# Patient Record
Sex: FEMALE | Race: AMERICAN INDIAN OR ALASKA NATIVE
[De-identification: names, ages, dates, MRNs, and addresses within clinical notes are randomized per-mention and may not be internally consistent; named-entity substitution may affect disease eponyms.]

---

## 2017-02-04 ENCOUNTER — HOSPITAL ENCOUNTER (EMERGENCY)
Dept: HOSPITAL 5 - ED | Age: 67
LOS: 1 days | Discharge: HOME | End: 2017-02-05
Payer: MEDICARE

## 2017-02-04 VITALS — SYSTOLIC BLOOD PRESSURE: 144 MMHG | DIASTOLIC BLOOD PRESSURE: 76 MMHG

## 2017-02-04 DIAGNOSIS — G89.29: ICD-10-CM

## 2017-02-04 DIAGNOSIS — M17.9: Primary | ICD-10-CM

## 2017-02-04 DIAGNOSIS — I25.2: ICD-10-CM

## 2017-02-04 DIAGNOSIS — Z90.710: ICD-10-CM

## 2017-02-04 DIAGNOSIS — H66.002: ICD-10-CM

## 2017-02-04 DIAGNOSIS — Z87.891: ICD-10-CM

## 2017-02-04 DIAGNOSIS — Z90.49: ICD-10-CM

## 2017-02-04 DIAGNOSIS — M19.90: ICD-10-CM

## 2017-02-04 DIAGNOSIS — J45.909: ICD-10-CM

## 2017-02-04 DIAGNOSIS — Z79.82: ICD-10-CM

## 2017-02-04 DIAGNOSIS — I10: ICD-10-CM

## 2017-02-04 DIAGNOSIS — Z98.51: ICD-10-CM

## 2017-02-04 DIAGNOSIS — K21.9: ICD-10-CM

## 2017-02-04 PROCEDURE — 99282 EMERGENCY DEPT VISIT SF MDM: CPT
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
## 2017-04-13 ENCOUNTER — HOSPITAL ENCOUNTER (EMERGENCY)
Dept: HOSPITAL 5 - ED | Age: 67
LOS: 1 days | Discharge: HOME | End: 2017-04-14
Payer: MEDICARE
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
## 2017-04-13 DIAGNOSIS — I25.2: ICD-10-CM

## 2017-04-13 DIAGNOSIS — Z88.8: ICD-10-CM

## 2017-04-13 DIAGNOSIS — Z79.82: ICD-10-CM

## 2017-04-13 DIAGNOSIS — Z88.1: ICD-10-CM

## 2017-04-13 DIAGNOSIS — G89.29: ICD-10-CM

## 2017-04-13 DIAGNOSIS — K21.9: ICD-10-CM
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
## 2017-04-13 DIAGNOSIS — I10: ICD-10-CM

## 2017-04-13 DIAGNOSIS — M54.5: Primary | ICD-10-CM

## 2017-04-13 DIAGNOSIS — M19.90: ICD-10-CM
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
## 2017-04-13 DIAGNOSIS — E11.9: ICD-10-CM
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
## 2017-04-13 DIAGNOSIS — J45.909: ICD-10-CM

## 2017-04-13 PROCEDURE — 99282 EMERGENCY DEPT VISIT SF MDM: CPT

## 2017-04-13 PROCEDURE — 96372 THER/PROPH/DIAG INJ SC/IM: CPT
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
## 2017-04-14 VITALS — SYSTOLIC BLOOD PRESSURE: 142 MMHG | DIASTOLIC BLOOD PRESSURE: 84 MMHG

## 2017-05-30 ENCOUNTER — HOSPITAL ENCOUNTER (EMERGENCY)
Dept: HOSPITAL 5 - ED | Age: 67
LOS: 1 days | Discharge: HOME | End: 2017-05-31
Payer: MEDICARE

## 2017-05-30 DIAGNOSIS — R11.2: Primary | ICD-10-CM

## 2017-05-30 DIAGNOSIS — I10: ICD-10-CM

## 2017-05-30 DIAGNOSIS — K21.9: ICD-10-CM

## 2017-05-30 DIAGNOSIS — I25.2: ICD-10-CM

## 2017-05-30 DIAGNOSIS — J45.909: ICD-10-CM

## 2017-05-30 LAB
ALBUMIN SERPL-MCNC: 4.1 G/DL (ref 3.9–5)
ALBUMIN/GLOB SERPL: 1.1 %
ALP SERPL-CCNC: 79 UNITS/L (ref 35–129)
ALT SERPL-CCNC: 19 UNITS/L (ref 7–56)
ANION GAP SERPL CALC-SCNC: 17 MMOL/L
BASOPHILS NFR BLD AUTO: 0.6 % (ref 0–1.8)
BILIRUB SERPL-MCNC: 0.2 MG/DL (ref 0.1–1.2)
BILIRUB UR QL STRIP: (no result)
BLOOD UR QL VISUAL: (no result)
BUN SERPL-MCNC: 19 MG/DL (ref 7–17)
BUN/CREAT SERPL: 31.66 %
CALCIUM SERPL-MCNC: 9.6 MG/DL (ref 8.4–10.2)
CHLORIDE SERPL-SCNC: 98.7 MMOL/L (ref 98–107)
CO2 SERPL-SCNC: 28 MMOL/L (ref 22–30)
EOSINOPHIL NFR BLD AUTO: 2.1 % (ref 0–4.3)
GLUCOSE SERPL-MCNC: 87 MG/DL (ref 65–100)
HCT VFR BLD CALC: 39.6 % (ref 30.3–42.9)
HGB BLD-MCNC: 12.5 GM/DL (ref 10.1–14.3)
KETONES UR STRIP-MCNC: (no result) MG/DL
LEUKOCYTE ESTERASE UR QL STRIP: (no result)
LIPASE SERPL-CCNC: 49 UNITS/L (ref 13–60)
MCH RBC QN AUTO: 23 PG (ref 28–32)
MCHC RBC AUTO-ENTMCNC: 32 % (ref 30–34)
MCV RBC AUTO: 73 FL (ref 79–97)
MUCOUS THREADS #/AREA URNS HPF: (no result) /HPF
NITRITE UR QL STRIP: (no result)
PH UR STRIP: 5 [PH] (ref 5–7)
PLATELET # BLD: 330 K/MM3 (ref 140–440)
POTASSIUM SERPL-SCNC: 4.5 MMOL/L (ref 3.6–5)
PROT SERPL-MCNC: 7.7 G/DL (ref 6.3–8.2)
PROT UR STRIP-MCNC: (no result) MG/DL
RBC # BLD AUTO: 5.45 M/MM3 (ref 3.65–5.03)
RBC #/AREA URNS HPF: 4 /HPF (ref 0–6)
SODIUM SERPL-SCNC: 139 MMOL/L (ref 137–145)
UROBILINOGEN UR-MCNC: < 2 MG/DL (ref ?–2)
WBC # BLD AUTO: 7.9 K/MM3 (ref 4.5–11)
WBC #/AREA URNS HPF: 2 /HPF (ref 0–6)

## 2017-05-30 PROCEDURE — 80053 COMPREHEN METABOLIC PANEL: CPT

## 2017-05-30 PROCEDURE — 96375 TX/PRO/DX INJ NEW DRUG ADDON: CPT

## 2017-05-30 PROCEDURE — 85025 COMPLETE CBC W/AUTO DIFF WBC: CPT

## 2017-05-30 PROCEDURE — 96374 THER/PROPH/DIAG INJ IV PUSH: CPT

## 2017-05-30 PROCEDURE — 83690 ASSAY OF LIPASE: CPT

## 2017-05-30 PROCEDURE — 81001 URINALYSIS AUTO W/SCOPE: CPT

## 2017-05-30 PROCEDURE — 36415 COLL VENOUS BLD VENIPUNCTURE: CPT

## 2017-05-30 PROCEDURE — 96361 HYDRATE IV INFUSION ADD-ON: CPT

## 2017-05-30 PROCEDURE — 99283 EMERGENCY DEPT VISIT LOW MDM: CPT

## 2017-05-31 VITALS — DIASTOLIC BLOOD PRESSURE: 73 MMHG | SYSTOLIC BLOOD PRESSURE: 132 MMHG
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
## 2017-07-06 ENCOUNTER — HOSPITAL ENCOUNTER (EMERGENCY)
Dept: HOSPITAL 5 - ED | Age: 67
Discharge: HOME | End: 2017-07-06
Payer: MEDICARE

## 2017-07-06 VITALS — SYSTOLIC BLOOD PRESSURE: 109 MMHG | DIASTOLIC BLOOD PRESSURE: 59 MMHG

## 2017-07-06 DIAGNOSIS — J45.909: ICD-10-CM

## 2017-07-06 DIAGNOSIS — I25.2: ICD-10-CM

## 2017-07-06 DIAGNOSIS — Z79.82: ICD-10-CM

## 2017-07-06 DIAGNOSIS — I10: ICD-10-CM

## 2017-07-06 DIAGNOSIS — K21.9: ICD-10-CM

## 2017-07-06 DIAGNOSIS — Z87.891: ICD-10-CM

## 2017-07-06 DIAGNOSIS — Z88.1: ICD-10-CM

## 2017-07-06 DIAGNOSIS — Z88.0: ICD-10-CM

## 2017-07-06 DIAGNOSIS — Z88.8: ICD-10-CM

## 2017-07-06 DIAGNOSIS — M17.11: Primary | ICD-10-CM

## 2017-07-06 PROCEDURE — 73562 X-RAY EXAM OF KNEE 3: CPT

## 2017-07-06 PROCEDURE — 96372 THER/PROPH/DIAG INJ SC/IM: CPT

## 2017-07-06 PROCEDURE — 99283 EMERGENCY DEPT VISIT LOW MDM: CPT

## 2017-07-06 NOTE — XRAY REPORT
Right knee 3 views.



Findings: There is mild narrowing of the joint space especially 

medially. Bony mineralization is normal. There are no fractures or 

other acute findings.



Impression: Mild osteoarthritis.
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
## 2017-08-10 ENCOUNTER — HOSPITAL ENCOUNTER (EMERGENCY)
Dept: HOSPITAL 5 - ED | Age: 67
Discharge: HOME | End: 2017-08-10
Payer: MEDICARE

## 2017-08-10 VITALS — DIASTOLIC BLOOD PRESSURE: 72 MMHG | SYSTOLIC BLOOD PRESSURE: 145 MMHG

## 2017-08-10 DIAGNOSIS — G89.29: ICD-10-CM

## 2017-08-10 DIAGNOSIS — M54.9: ICD-10-CM

## 2017-08-10 DIAGNOSIS — M25.569: Primary | ICD-10-CM

## 2017-08-10 PROCEDURE — 99282 EMERGENCY DEPT VISIT SF MDM: CPT
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
## 2017-08-18 ENCOUNTER — HOSPITAL ENCOUNTER (EMERGENCY)
Dept: HOSPITAL 5 - ED | Age: 67
Discharge: HOME | End: 2017-08-18
Payer: MEDICARE

## 2017-08-18 VITALS — SYSTOLIC BLOOD PRESSURE: 166 MMHG | DIASTOLIC BLOOD PRESSURE: 79 MMHG

## 2017-08-18 DIAGNOSIS — Z88.1: ICD-10-CM

## 2017-08-18 DIAGNOSIS — G89.29: ICD-10-CM

## 2017-08-18 DIAGNOSIS — J45.909: ICD-10-CM

## 2017-08-18 DIAGNOSIS — I25.2: ICD-10-CM

## 2017-08-18 DIAGNOSIS — Z88.8: ICD-10-CM

## 2017-08-18 DIAGNOSIS — Z87.891: ICD-10-CM

## 2017-08-18 DIAGNOSIS — Z79.82: ICD-10-CM

## 2017-08-18 DIAGNOSIS — K21.9: ICD-10-CM

## 2017-08-18 DIAGNOSIS — M15.3: Primary | ICD-10-CM

## 2017-08-18 DIAGNOSIS — I10: ICD-10-CM

## 2017-08-18 LAB — URINE DRUGS OF ABUSE NOTE: (no result)

## 2017-08-18 PROCEDURE — 72100 X-RAY EXAM L-S SPINE 2/3 VWS: CPT

## 2017-08-18 PROCEDURE — 80307 DRUG TEST PRSMV CHEM ANLYZR: CPT

## 2017-08-18 NOTE — XRAY REPORT
FINAL REPORT



PROCEDURE:  XR SPINE LUMBOSACRAL 2-3V



TECHNIQUE:  Lumbar spine, three views



HISTORY:  lower back pain chronic 



COMPARISON:  No prior studies are available for comparison.



FINDINGS:  

There is 9 millimeters anterolisthesis of L5 on S1, with adjacent

significant endplate sclerosis and disc space narrowing. There

may be pars interarticularis defects of L5, which would be better

assessed with oblique views. No scoliosis. There is note of

unfused posterior elements of L5.



IMPRESSION:  

9 millimeters anterolisthesis of L5 on S1 with associated

significant degenerative disc changes. Possible L5 pars

interarticularis defects, which would be better assessed with

oblique views.
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
## 2017-08-19 NOTE — VASCULAR LAB REPORT
LEFT UPPER EXTREMITY VENOUS DUPLEX:



REASON FOR EXAM: Pain of the left upper extremity



COMMENTS ON THE LEFT:

All arm veins visualized are freely compressible without

evidence of internal echogenicity.  The subclavian and internal

jugular veins are free of thrombus.  Flow is spontaneous and phasic 

throughout.



COMMENTS ON THE RIGHT:

The subclavian and internal jugular veins are free of thrombus.  



IMPRESSION:



No evidence of acute or chronic deep venous thrombosis in the

left upper extremity.

## 2018-01-14 ENCOUNTER — HOSPITAL ENCOUNTER (EMERGENCY)
Dept: HOSPITAL 5 - ED | Age: 68
Discharge: HOME | End: 2018-01-14
Payer: MEDICARE

## 2018-01-14 DIAGNOSIS — J09.X2: Primary | ICD-10-CM

## 2018-01-14 DIAGNOSIS — I25.2: ICD-10-CM

## 2018-01-14 DIAGNOSIS — I10: ICD-10-CM

## 2018-01-14 DIAGNOSIS — K21.9: ICD-10-CM

## 2018-01-14 DIAGNOSIS — Z87.891: ICD-10-CM

## 2018-01-14 LAB
BASOPHILS # (AUTO): 0 K/MM3 (ref 0–0.1)
BASOPHILS NFR BLD AUTO: 1 % (ref 0–1.8)
BUN SERPL-MCNC: 25 MG/DL (ref 7–17)
BUN/CREAT SERPL: 28 %
CALCIUM SERPL-MCNC: 9.7 MG/DL (ref 8.4–10.2)
EOSINOPHIL # BLD AUTO: 0 K/MM3 (ref 0–0.4)
EOSINOPHIL NFR BLD AUTO: 1 % (ref 0–4.3)
HCT VFR BLD CALC: 43.3 % (ref 30.3–42.9)
HEMOLYSIS INDEX: 3
HGB BLD-MCNC: 13.4 GM/DL (ref 10.1–14.3)
LYMPHOCYTES # BLD AUTO: 2.2 K/MM3 (ref 1.2–5.4)
LYMPHOCYTES NFR BLD AUTO: 46.2 % (ref 13.4–35)
MCH RBC QN AUTO: 22 PG (ref 28–32)
MCHC RBC AUTO-ENTMCNC: 31 % (ref 30–34)
MCV RBC AUTO: 72 FL (ref 79–97)
MONOCYTES # (AUTO): 0.6 K/MM3 (ref 0–0.8)
MONOCYTES % (AUTO): 13.2 % (ref 0–7.3)
PLATELET # BLD: 304 K/MM3 (ref 140–440)
RBC # BLD AUTO: 6.04 M/MM3 (ref 3.65–5.03)

## 2018-01-14 PROCEDURE — 93005 ELECTROCARDIOGRAM TRACING: CPT

## 2018-01-14 PROCEDURE — 80048 BASIC METABOLIC PNL TOTAL CA: CPT

## 2018-01-14 PROCEDURE — 36415 COLL VENOUS BLD VENIPUNCTURE: CPT

## 2018-01-14 PROCEDURE — 85025 COMPLETE CBC W/AUTO DIFF WBC: CPT

## 2018-01-14 PROCEDURE — 71046 X-RAY EXAM CHEST 2 VIEWS: CPT

## 2018-01-14 PROCEDURE — 87491 CHLMYD TRACH DNA AMP PROBE: CPT

## 2018-01-14 PROCEDURE — 94640 AIRWAY INHALATION TREATMENT: CPT

## 2018-01-14 PROCEDURE — 93010 ELECTROCARDIOGRAM REPORT: CPT

## 2018-01-14 PROCEDURE — 94644 CONT INHLJ TX 1ST HOUR: CPT

## 2018-01-14 PROCEDURE — 87400 INFLUENZA A/B EACH AG IA: CPT

## 2018-01-14 PROCEDURE — 99284 EMERGENCY DEPT VISIT MOD MDM: CPT

## 2018-01-14 PROCEDURE — 96360 HYDRATION IV INFUSION INIT: CPT

## 2018-01-14 PROCEDURE — 82550 ASSAY OF CK (CPK): CPT
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
## 2018-01-14 NOTE — XRAY REPORT
FINAL REPORT



EXAM:  XR CHEST ROUTINE 2V



HISTORY:  persistent cough worsening 



TECHNIQUE:  2 views of the chest.



PRIORS:  12/08/2016



FINDINGS:  

The cardiomediastinal silhouette appears normal. The lungs are

clear. The bones and soft tissues are unremarkable.



IMPRESSION:  

No evidence of acute cardiopulmonary disease

## 2018-01-14 NOTE — EMERGENCY DEPARTMENT REPORT
Chief Complaint: Upper Respiratory Infection


Stated Complaint: VOMITING/DIARRHEA/CHEST COLD


Time Seen by Provider: 01/14/18 20:12





- HPI


History of Present Illness: 


67-year-old female past medical history arthritis asthma GERD MI hypertension 

kidney stones angina CAD presents with complaint of 4-5 days of bodyaches nasal 

congestion nausea and diarrhea.  Patient awake alert and oriented 3 

complaining of slightly productive cough and nasal drainage.





- ROS


Review of Systems: 


4-5 days of body aches, upper respiratory symptoms





- Exam


Vital Signs: 


 Vital Signs











  01/14/18





  17:22


 


Temperature 98.4 F


 


Pulse Rate 103 H


 


Respiratory 16





Rate 


 


Blood Pressure 119/71


 


O2 Sat by Pulse 97





Oximetry 











Physical Exam: 


Wheezing on auscultation bilaterally


MSE screening note: 


Focused history and physical exam performed.


Due to findings the following was ordered:


Screening Assessment/Plan/Differential Dx: Possible flu illness, asthma 

exacerbation


1- This initial assessment/diagnostic orders/clinical plan/ treatment(s) is/are 

subject to change based on pt's health status, clinical progression and re-

assessment by fellow clinical providers in the ED. Further treatment and workup 

at subsequent clinical provers discretion. Patient/guardians urged not to elope 

from ED as their condition may be serious if not clinically assessed and 

managed. 


2-flu swab sent, chest x-ray, labs


3-nebulizer treatment, oral fluid resuscitation








ED Disposition for MSE


Condition: Stable


Referrals: 


SHONDA TORREZ MD [Primary Care Provider] - 3-5 Days
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
## 2018-01-15 VITALS — SYSTOLIC BLOOD PRESSURE: 137 MMHG | DIASTOLIC BLOOD PRESSURE: 61 MMHG

## 2018-01-29 ENCOUNTER — HOSPITAL ENCOUNTER (OUTPATIENT)
Dept: HOSPITAL 5 - ED | Age: 68
Setting detail: OBSERVATION
LOS: 2 days | Discharge: HOME | End: 2018-01-31
Attending: INTERNAL MEDICINE | Admitting: INTERNAL MEDICINE
Payer: MEDICARE

## 2018-01-29 DIAGNOSIS — I25.10: ICD-10-CM

## 2018-01-29 DIAGNOSIS — Z90.710: ICD-10-CM

## 2018-01-29 DIAGNOSIS — Z90.49: ICD-10-CM

## 2018-01-29 DIAGNOSIS — Z87.442: ICD-10-CM

## 2018-01-29 DIAGNOSIS — Z87.891: ICD-10-CM

## 2018-01-29 DIAGNOSIS — J45.909: ICD-10-CM

## 2018-01-29 DIAGNOSIS — M19.90: ICD-10-CM

## 2018-01-29 DIAGNOSIS — I25.2: ICD-10-CM

## 2018-01-29 DIAGNOSIS — I10: ICD-10-CM

## 2018-01-29 DIAGNOSIS — R07.89: Primary | ICD-10-CM

## 2018-01-29 DIAGNOSIS — K21.9: ICD-10-CM

## 2018-01-29 DIAGNOSIS — E11.9: ICD-10-CM

## 2018-01-29 LAB
BASOPHILS # (AUTO): 0.1 K/MM3 (ref 0–0.1)
BASOPHILS NFR BLD AUTO: 1.1 % (ref 0–1.8)
BUN SERPL-MCNC: 10 MG/DL (ref 7–17)
BUN SERPL-MCNC: 8 MG/DL (ref 7–17)
BUN/CREAT SERPL: 13 %
BUN/CREAT SERPL: 17 %
CALCIUM SERPL-MCNC: 8.9 MG/DL (ref 8.4–10.2)
CALCIUM SERPL-MCNC: 9.5 MG/DL (ref 8.4–10.2)
EOSINOPHIL # BLD AUTO: 0.1 K/MM3 (ref 0–0.4)
EOSINOPHIL NFR BLD AUTO: 1.2 % (ref 0–4.3)
HCT VFR BLD CALC: 40.2 % (ref 30.3–42.9)
HEMOLYSIS INDEX: 8
HEMOLYSIS INDEX: 8
HGB BLD-MCNC: 13 GM/DL (ref 10.1–14.3)
LYMPHOCYTES # BLD AUTO: 1.9 K/MM3 (ref 1.2–5.4)
LYMPHOCYTES NFR BLD AUTO: 31.5 % (ref 13.4–35)
MCH RBC QN AUTO: 23 PG (ref 28–32)
MCHC RBC AUTO-ENTMCNC: 32 % (ref 30–34)
MCV RBC AUTO: 72 FL (ref 79–97)
MONOCYTES # (AUTO): 0.6 K/MM3 (ref 0–0.8)
MONOCYTES % (AUTO): 10.2 % (ref 0–7.3)
PLATELET # BLD: 368 K/MM3 (ref 140–440)
RBC # BLD AUTO: 5.59 M/MM3 (ref 3.65–5.03)

## 2018-01-29 PROCEDURE — 82553 CREATINE MB FRACTION: CPT

## 2018-01-29 PROCEDURE — 85379 FIBRIN DEGRADATION QUANT: CPT

## 2018-01-29 PROCEDURE — 80048 BASIC METABOLIC PNL TOTAL CA: CPT

## 2018-01-29 PROCEDURE — 82550 ASSAY OF CK (CPK): CPT

## 2018-01-29 PROCEDURE — 83880 ASSAY OF NATRIURETIC PEPTIDE: CPT

## 2018-01-29 PROCEDURE — G0378 HOSPITAL OBSERVATION PER HR: HCPCS

## 2018-01-29 PROCEDURE — 84484 ASSAY OF TROPONIN QUANT: CPT

## 2018-01-29 PROCEDURE — 93010 ELECTROCARDIOGRAM REPORT: CPT

## 2018-01-29 PROCEDURE — 93971 EXTREMITY STUDY: CPT

## 2018-01-29 PROCEDURE — 99285 EMERGENCY DEPT VISIT HI MDM: CPT

## 2018-01-29 PROCEDURE — 71045 X-RAY EXAM CHEST 1 VIEW: CPT

## 2018-01-29 PROCEDURE — 99406 BEHAV CHNG SMOKING 3-10 MIN: CPT

## 2018-01-29 PROCEDURE — 85025 COMPLETE CBC W/AUTO DIFF WBC: CPT

## 2018-01-29 PROCEDURE — 96372 THER/PROPH/DIAG INJ SC/IM: CPT

## 2018-01-29 PROCEDURE — 96374 THER/PROPH/DIAG INJ IV PUSH: CPT

## 2018-01-29 PROCEDURE — 36415 COLL VENOUS BLD VENIPUNCTURE: CPT

## 2018-01-29 PROCEDURE — 96376 TX/PRO/DX INJ SAME DRUG ADON: CPT

## 2018-01-29 PROCEDURE — 93005 ELECTROCARDIOGRAM TRACING: CPT

## 2018-01-29 NOTE — XRAY REPORT
FINAL REPORT



EXAM:  XR CHEST 1V AP



HISTORY:  chest pain 



TECHNIQUE:  AP portable view of the chest



PRIORS:  CXR 01/14/2018, 12/08/2016



FINDINGS:  

Lines, tubes, and devices:  N/A



Lungs and pleura: Trachea is normal in position. New markings in

the right lung base are suspicious for atelectasis or pneumonia.

There is no evidence for pleural effusion, vascular congestion,

or pneumothorax. 



Cardiomediastinal silhouette: Cardiac and mediastinal silhouettes

are unremarkable.



Other: Bony structures are intact.



IMPRESSION:  

New markings in the right lung base suspicious for atelectasis or

pneumonia.
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
## 2018-01-30 RX ADMIN — NITROGLYCERIN SCH: 20 OINTMENT TOPICAL at 10:36

## 2018-01-30 RX ADMIN — HYDROMORPHONE HYDROCHLORIDE PRN MG: 1 INJECTION, SOLUTION INTRAMUSCULAR; INTRAVENOUS; SUBCUTANEOUS at 14:13

## 2018-01-30 RX ADMIN — HYDROMORPHONE HYDROCHLORIDE PRN MG: 1 INJECTION, SOLUTION INTRAMUSCULAR; INTRAVENOUS; SUBCUTANEOUS at 06:29

## 2018-01-30 RX ADMIN — NITROGLYCERIN SCH: 20 OINTMENT TOPICAL at 14:36

## 2018-01-30 RX ADMIN — OSELTAMIVIR PHOSPHATE SCH MG: 75 CAPSULE ORAL at 21:58

## 2018-01-30 RX ADMIN — HYDROMORPHONE HYDROCHLORIDE PRN MG: 1 INJECTION, SOLUTION INTRAMUSCULAR; INTRAVENOUS; SUBCUTANEOUS at 00:31

## 2018-01-30 RX ADMIN — OSELTAMIVIR PHOSPHATE SCH MG: 75 CAPSULE ORAL at 10:17

## 2018-01-30 RX ADMIN — NITROGLYCERIN SCH: 20 OINTMENT TOPICAL at 06:28

## 2018-01-30 RX ADMIN — NITROGLYCERIN SCH: 20 OINTMENT TOPICAL at 17:27

## 2018-01-30 NOTE — PROGRESS NOTE
Assessment and Plan


Assessment and plan: 


67-year-old female recent diagnosis of influenza presents to the hospital with 

generalized chest wall pain reproducible associated with cough had been 

diagnosed with influenza and started on Tamiflu but did not take the medication 

on discharge to previous admission.  Now with suspected atelectasis versus 

pneumonia





Atypical chest pain likely costochondritis


Resolving post-influenza pneumonia


Hypertension


CAD


GERD


Osteoarthritis





Plan


Continue supportive care


Pain control


Start on Levaquin


Chest x-ray reviewed shows possible atelectasis versus pneumonia will also add 

incentive spirometer


Anticipated discharge in a.m.


Lower extremity Doppler did not reveal any DVT


Extensive counseling provided to the patient greater than 15 minutes and 

remained to be compliant with medications.


Resume appropriate home medications








History


Interval history: 


Patient seen and examined this morning reports that she did not take the 

Tamiflu that was ordered for her previously because she does not believe this 

medications work she prefers herbal medications.  But she likes to Dilaudid as 

it is the only one that takes away her pain.  She states "I raise my hand on 

thank God of Viagra before as shOT and it took away my pain"








Hospitalist Physical





- Physical exam


Narrative exam: 


 VITAL SIGNS:  Reviewed.    


GENERAL:  The patient appeared well nourished and normally developed. Vital 

signs as documented.


HEAD:  No signs of head trauma.


EYES:  Pupils are equal.  Extraocular motions intact.  


EARS:  Hearing grossly intact.


MOUTH:  Oropharynx is normal. 


NECK:  No adenopathy, no JVD.   


CHEST:  Chest with clear breath sounds bilaterally.  No wheezes, rales, or 

rhonchi.  


CARDIAC:  Regular rate and rhythm.  S1 and S2, without murmurs, gallops, or 

rubs.


VASCULAR:  No Edema.  Peripheral pulses normal and equal in all extremities.


ABDOMEN:  Soft, without detectable tenderness.  No sign of distention.  No   

rebound or guarding, and no masses palpated.   Bowel Sounds normal.


MUSCULOSKELETAL: Generalized tenderness in the musculoskeletal area across the 

chest on palpation Good range of motion of all major joints. Extremities 

without clubbing, cyanosis.  Trace edema of the right lower extremity .  


NEUROLOGIC EXAM:  Alert and oriented x 3.  No focal sensory or strength 

deficits.   Speech normal.  Follows commands.


PSYCHIATRIC:  Mood normal.


SKIN:  No rash or lesions.














- Constitutional


Vitals: 


 











Temp Pulse Resp BP Pulse Ox


 


 97.9 F   77   16   136/80   93 


 


 01/30/18 12:45  01/30/18 12:45  01/30/18 09:00  01/30/18 12:45  01/30/18 09:00














Results





- Labs


CBC & Chem 7: 


 01/29/18 12:52





 01/29/18 22:27


Labs: 


 Laboratory Last Values











WBC  6.1 K/mm3 (4.5-11.0)   01/29/18  12:52    


 


RBC  5.59 M/mm3 (3.65-5.03)  H  01/29/18  12:52    


 


Hgb  13.0 gm/dl (10.1-14.3)   01/29/18  12:52    


 


Hct  40.2 % (30.3-42.9)   01/29/18  12:52    


 


MCV  72 fl (79-97)  L  01/29/18  12:52    


 


MCH  23 pg (28-32)  L  01/29/18  12:52    


 


MCHC  32 % (30-34)   01/29/18  12:52    


 


RDW  16.1 % (13.2-15.2)  H  01/29/18  12:52    


 


Plt Count  368 K/mm3 (140-440)   01/29/18  12:52    


 


Lymph % (Auto)  31.5 % (13.4-35.0)   01/29/18  12:52    


 


Mono % (Auto)  10.2 % (0.0-7.3)  H  01/29/18  12:52    


 


Eos % (Auto)  1.2 % (0.0-4.3)   01/29/18  12:52    


 


Baso % (Auto)  1.1 % (0.0-1.8)   01/29/18  12:52    


 


Lymph #  1.9 K/mm3 (1.2-5.4)   01/29/18  12:52    


 


Mono #  0.6 K/mm3 (0.0-0.8)   01/29/18  12:52    


 


Eos #  0.1 K/mm3 (0.0-0.4)   01/29/18  12:52    


 


Baso #  0.1 K/mm3 (0.0-0.1)   01/29/18  12:52    


 


Seg Neutrophils %  56.0 % (40.0-70.0)   01/29/18  12:52    


 


Seg Neutrophils #  3.4 K/mm3 (1.8-7.7)   01/29/18  12:52    


 


D-Dimer  222.79 ng/mlDDU (0-234)   01/29/18  17:46    


 


Sodium  140 mmol/L (137-145)   01/29/18  22:27    


 


Potassium  3.7 mmol/L (3.6-5.0)   01/29/18  22:27    


 


Chloride  97.4 mmol/L ()  L  01/29/18  22:27    


 


Carbon Dioxide  31 mmol/L (22-30)  H  01/29/18  22:27    


 


Anion Gap  15 mmol/L  01/29/18  22:27    


 


BUN  10 mg/dL (7-17)   01/29/18  22:27    


 


Creatinine  0.6 mg/dL (0.7-1.2)  L  01/29/18  22:27    


 


Estimated GFR  > 60 ml/min  01/29/18  22:27    


 


BUN/Creatinine Ratio  17 %  01/29/18  22:27    


 


Glucose  118 mg/dL ()  H  01/29/18  22:27    


 


Calcium  8.9 mg/dL (8.4-10.2)   01/29/18  22:27    


 


Total Creatine Kinase  77 units/L ()   01/30/18  05:19    


 


CK-MB (CK-2)  1.1 ng/mL (0.0-4.0)   01/30/18  05:19    


 


Troponin T  < 0.010 ng/mL (0.00-0.029)   01/30/18  13:08    


 


NT-Pro-B Natriuret Pep  39.10 pg/mL (0-900)   01/29/18  17:46

## 2018-01-30 NOTE — HISTORY AND PHYSICAL REPORT
CHIEF COMPLAINT:  Chest pain.



HISTORY OF PRESENT ILLNESS:  This is a 67-year-old female who said she has been

having chest pain since a day prior to presentation with pain radiating to the

back and was associated with shortness of breath.  There is a history of nausea,

but no vomiting.  There is also history of cough.  The patient said the pain

feels like the pain of angina, presented to the Emergency Room.  There is

history of diaphoresis, but no history of dizziness.  The patient was evaluated

and presented for admission.



PAST MEDICAL HISTORY:  Pertinent for hypertension, coronary artery disease

status post myocardial infarct, diabetes mellitus, gastroesophageal reflux

disease, osteoarthritis, kidney stones, asthma.



PAST SURGICAL HISTORY:  Pertinent for cholecystectomy, right eye surgery,

hysterectomy.  There is a procedure on the heart and tubal ligation as well as

carpal tunnel surgery with colonic polyps.



FAMILY HISTORY:  Noncontributory.



SOCIAL HISTORY:  The patient is a former smoker, but does not smoke currently. 

Does not use illicit drugs and does not drink alcohol.



MEDICATIONS:  The patient is on thiazide 25 mg by mouth daily, Norco 7.5/325 mg

1 by mouth every 6 hours as needed for pain, Protonix 40 mg by mouth daily as

well as albuterol inhalers 2 puffs 4 times a day, benztropine 100 mg by mouth 3

times a day as well as Norco 5/325, 1-2 by mouth every 6 hours.  The patient is

also on Tamiflu 75 mg by mouth twice daily.



ALLERGIES:  THE PATIENT IS ALLERGIC TO AZITHROMYCIN, BUTORPHANOL TARTRATE,

CYCLOBENZAPRINE, AND OTHER MEDICATIONS THAT ARE NOT LISTED.



REVIEW OF SYSTEMS:

CONSTITUTIONAL:  There is no fever, no chills.  Diaphoresis present.

HEENT:  There is no headache or sore throat.

CARDIOVASCULAR:  Chest pain present.  No orthopnea.

RESPIRATORY:  Shortness of breath present.  Cough present.

GASTROINTESTINAL:  There is nausea, but no vomiting, no abdominal pain, diarrhea

or constipation.

NEUROLOGICAL:  There is no numbness, no dizziness, no altered mental status.

MUSCULOSKELETAL:  Show no joint pain or swelling.

DERMATOLOGICAL:  There is no skin rash or itching.

GENITOURINARY:  There is no dysuria, hematuria or flank pain.

Rest of system review is normal.



PHYSICAL EXAMINATION:

GENERAL:  At the time of exam, the patient was found to be alert, oriented x 3

in mild due to chest.

VITAL SIGNS:  Shows normal temperature with pulse of 82, blood pressure 122/86,

O2 sat of 99% in room air.

HEENT:  Showed pupils to be equal, round, reactive to light and accommodation. 

Extraocular muscles are intact.

NECK:  Supple with no JVD or carotid bruit.

CARDIOVASCULAR:  Showed normal first and second heart sounds with no gallops or

murmurs.

RESPIRATORY:  Show good air entry on both sides of the lung with no abnormal

breath sounds.

GASTROINTESTINAL:  Show abdomen to be full, soft, nontender with no organomegaly

or rigidity.

NEUROLOGICAL:  Shows no focal deficit.

MUSCULOSKELETAL:  Show no joint swelling or tenderness.

DERMATOLOGICAL:  Show no change, no skin rash or itching.

GENITOURINARY:  Showing no costovertebral angle tenderness.



PERTINENT LABORATORY AND IMAGING STUDIES:  The patient had chest x-ray done that

shows new markings in the right lung base suspicious atelectasis or pneumonia. 

The patient's lab shows CBC with normal white count, normal hemoglobin, normal

hematocrit with CBC differential being unremarkable.  The patient's D-dimer

level came back normal.  Chemistry was unremarkable.  First level of troponin

came back normal.  Brain natriuretic peptide was unremarkable.



DIAGNOSIS:  Chest pain.



PLAN:  The patient will be admitted to telemetry using chest protocol.  Will

have serial cardiac enzymes done q. 6 hours x 2 more levels.  The patient will

be on nitro paste half-inch to anterior chest wall 4 times a day and will be on

sublingual nitroglycerin for breakthrough chest pain.  The patient will be on

Dilaudid 1 mg every 4 hours IV as needed for pain and IV Zofran 4 mg every 6

hours for nausea and vomiting.  The patient will be on p.r.n. medications like

Tylenol 650 mg by mouth every 4 hours as needed for fever and headache and will

be on home medications as shown in the medication reconciliation section.  The

patient will also be on aspirin 325 mg by mouth daily and will remain n.p.o. for

pharmacological stress test in the morning.  The patient will also be on oxygen

by nasal cannula at 2 liters per minute per chest pain protocol.





DD: 01/30/2018 05:46

DT: 01/30/2018 08:45

JOB# [id number]  [id number]

OCN/NTS

## 2018-01-31 VITALS — SYSTOLIC BLOOD PRESSURE: 119 MMHG | DIASTOLIC BLOOD PRESSURE: 59 MMHG

## 2018-01-31 RX ADMIN — NITROGLYCERIN SCH: 20 OINTMENT TOPICAL at 06:15

## 2018-01-31 NOTE — DISCHARGE SUMMARY
Providers





- Providers


Date of Admission: 


01/29/18 21:18





Attending physician: 


GILMAR HART MD





 





01/29/18


Consult to Cardiac Rehabilitation [CONS] Routine 


   Reason For Exam: Phase I











Primary care physician: 


MIRELLA RIOS








Hospitalization


Reason for admission: chest pain


Condition: Stable


Hospital course: 


67-year-old female recent diagnosis of influenza presents to the hospital with 

generalized chest wall pain reproducible associated with cough had been 

diagnosed with influenza and started on Tamiflu but did not take the medication 

on discharge to previous admission.  Now with suspected atelectasis versus 

pneumonia. patient was treated with antiobiodiccs and also a few hours. She 

likely will go home with a Peg;





Discharge diagnosis


Atypical chest pain likely costochondritis


Resolving post-influenza pneumonia


Hypertension


CAD


GERD


Osteoarthritis











Disposition: DC-01 TO HOME OR SELFCARE


Time spent for discharge: 35 mins





Core Measure Documentation





- Palliative Care


Palliative Care/ Comfort Measures: Not Applicable





- Core Measures


Any of the following diagnoses?: none





- VTE Discharge Requirements


Deep Vein Thrombosis/Pulmonary Embolism Present on Admission: No





Exam





- Physical Exam


Narrative exam: 


 VITAL SIGNS:  Reviewed.    


GENERAL:  The patient appeared well nourished and normally developed. Vital 

signs as documented.


HEAD:  No signs of head trauma.


EYES:  Pupils are equal.  Extraocular motions intact.  


EARS:  Hearing grossly intact.


MOUTH:  Oropharynx is normal. 


NECK:  No adenopathy, no JVD.   


CHEST:  Chest with clear breath sounds bilaterally.  No wheezes, rales, or 

rhonchi.  


CARDIAC:  Regular rate and rhythm.  S1 and S2, without murmurs, gallops, or 

rubs.


VASCULAR:  No Edema.  Peripheral pulses normal and equal in all extremities.


ABDOMEN:  Soft, without detectable tenderness.  No sign of distention.  No   

rebound or guarding, and no masses palpated.   Bowel Sounds normal.


MUSCULOSKELETAL: Generalized tenderness in the musculoskeletal area across the 

chest on palpation Good range of motion of all major joints. Extremities 

without clubbing, cyanosis.  Trace edema of the right lower extremity .  


NEUROLOGIC EXAM:  Alert and oriented x 3.  No focal sensory or strength 

deficits.   Speech normal.  Follows commands.


PSYCHIATRIC:  Mood normal.


SKIN:  No rash or lesions.














- Constitutional


Vitals: 


 











Temp Pulse Resp BP Pulse Ox


 


 97.5 F L  79   19   119/59   97 


 


 01/31/18 04:41  01/31/18 08:20  01/31/18 04:41  01/31/18 04:41  01/31/18 04:41














Plan


Activity: advance as tolerated, fall precautions


Diet: low fat


Special Instructions: record daily weights, record daily BP diary, smoking 

cessation


Follow up with: 


MIRELLA RIOS MD [Primary Care Provider] - 7 Days


NELSON BYNUM MD [Staff Physician] - 7 Days


TIMOTHY SQUIRES MD [Staff Physician] - 7 Days


Prescriptions: 


Benzonatate [Tessalon Perles] 100 mg PO TID PRN #14 capsule


 PRN Reason: Cough


Famotidine [Pepcid] 20 mg PO BID #60 tablet


Levofloxacin [Levaquin TAB] 750 mg PO Q24HR #5 tablet


Oseltamivir [Tamiflu] 75 mg PO BID #6 capsule


traMADol [Ultram] 50 mg PO Q6HR PRN #14 tablet


 PRN Reason: Pain

## 2018-02-02 NOTE — VASCULAR LAB REPORT
RIGHT UPPER EXTREMITY VENOUS DUPLEX:



REASON FOR EXAM: Deep venous thrombus of the right upper extremity



COMMENTS ON THE RIGHT:

There is an acute partially occlusive short segment superficial venous 

thrombus in the right upper arm cephalic vein near the antecubital 

fossa.  The remaining veins visualized are freely compressible without 

evidence of internal echogenicity.  Spontaneous and phasic flow is 

present proximally.



COMMENTS ON THE LEFT:

The subclavian and internal jugular veins are free of thrombus.  



IMPRESSION:

No evidence of acute or chronic deep venous thrombosis in the

right upper extremity.

There is a acute short segment superficial venous thrombus in the right 

upper arm cephalic vein near the antecubital fossa. This is near the 

patient's IV site.

## 2018-06-27 ENCOUNTER — HOSPITAL ENCOUNTER (EMERGENCY)
Dept: HOSPITAL 5 - ED | Age: 68
Discharge: HOME | End: 2018-06-27
Payer: MEDICARE

## 2018-06-27 VITALS — SYSTOLIC BLOOD PRESSURE: 145 MMHG | DIASTOLIC BLOOD PRESSURE: 72 MMHG

## 2018-06-27 DIAGNOSIS — I10: Primary | ICD-10-CM

## 2018-06-27 DIAGNOSIS — Z87.891: ICD-10-CM

## 2018-06-27 DIAGNOSIS — K21.9: ICD-10-CM

## 2018-06-27 DIAGNOSIS — Z88.1: ICD-10-CM

## 2018-06-27 DIAGNOSIS — Z79.82: ICD-10-CM

## 2018-06-27 DIAGNOSIS — Z88.8: ICD-10-CM

## 2018-06-27 DIAGNOSIS — G89.29: ICD-10-CM

## 2018-06-27 DIAGNOSIS — J45.909: ICD-10-CM

## 2018-06-27 DIAGNOSIS — M54.9: ICD-10-CM

## 2018-06-27 DIAGNOSIS — I20.9: ICD-10-CM

## 2018-06-27 LAB
BILIRUB UR QL STRIP: (no result)
BLOOD UR QL VISUAL: (no result)
PH UR STRIP: 5 [PH] (ref 5–7)
PROT UR STRIP-MCNC: (no result) MG/DL
RBC #/AREA URNS HPF: 2 /HPF (ref 0–6)
UROBILINOGEN UR-MCNC: < 2 MG/DL (ref ?–2)
WBC #/AREA URNS HPF: 1 /HPF (ref 0–6)

## 2018-06-27 PROCEDURE — 81001 URINALYSIS AUTO W/SCOPE: CPT

## 2018-06-27 PROCEDURE — 93005 ELECTROCARDIOGRAM TRACING: CPT

## 2018-06-27 PROCEDURE — 93010 ELECTROCARDIOGRAM REPORT: CPT

## 2018-06-27 PROCEDURE — 99283 EMERGENCY DEPT VISIT LOW MDM: CPT
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
## 2019-02-11 ENCOUNTER — HOSPITAL ENCOUNTER (OUTPATIENT)
Dept: HOSPITAL 5 - XRAY | Age: 69
Discharge: HOME | End: 2019-02-11
Attending: FAMILY MEDICINE
Payer: MEDICARE

## 2019-02-11 DIAGNOSIS — E11.9: ICD-10-CM

## 2019-02-11 DIAGNOSIS — J45.909: ICD-10-CM

## 2019-02-11 DIAGNOSIS — E78.00: ICD-10-CM

## 2019-02-11 DIAGNOSIS — Z87.891: ICD-10-CM

## 2019-02-11 DIAGNOSIS — M19.90: ICD-10-CM

## 2019-02-11 DIAGNOSIS — K21.9: ICD-10-CM

## 2019-02-11 DIAGNOSIS — I11.0: ICD-10-CM

## 2019-02-11 DIAGNOSIS — E66.01: ICD-10-CM

## 2019-02-11 DIAGNOSIS — Z11.1: Primary | ICD-10-CM

## 2019-02-11 DIAGNOSIS — I50.9: ICD-10-CM

## 2019-02-11 DIAGNOSIS — Z90.49: ICD-10-CM

## 2019-02-11 DIAGNOSIS — Z90.710: ICD-10-CM

## 2019-02-11 PROCEDURE — 71046 X-RAY EXAM CHEST 2 VIEWS: CPT

## 2019-02-11 NOTE — XRAY REPORT
ROUTINE CHEST, TWO VIEWS:



HISTORY:  Encounter for screening for respiratory tuberculosis.



The trachea, heart, mediastinal contour, lung fields and bony thorax 

are unremarkable. No significant change since 10/24/18.



IMPRESSION:

Unremarkable chest x-ray.

## 2019-06-04 ENCOUNTER — HOSPITAL ENCOUNTER (EMERGENCY)
Dept: HOSPITAL 5 - ED | Age: 69
Discharge: HOME | End: 2019-06-04
Payer: MEDICARE

## 2019-06-04 VITALS — SYSTOLIC BLOOD PRESSURE: 148 MMHG | DIASTOLIC BLOOD PRESSURE: 68 MMHG

## 2019-06-04 DIAGNOSIS — G89.29: Primary | ICD-10-CM

## 2019-06-04 DIAGNOSIS — I11.0: ICD-10-CM

## 2019-06-04 DIAGNOSIS — M25.562: ICD-10-CM

## 2019-06-04 DIAGNOSIS — Z88.1: ICD-10-CM

## 2019-06-04 DIAGNOSIS — M54.9: ICD-10-CM

## 2019-06-04 DIAGNOSIS — Z90.49: ICD-10-CM

## 2019-06-04 DIAGNOSIS — Z88.6: ICD-10-CM

## 2019-06-04 DIAGNOSIS — M25.561: ICD-10-CM

## 2019-06-04 DIAGNOSIS — M19.90: ICD-10-CM

## 2019-06-04 DIAGNOSIS — J45.909: ICD-10-CM

## 2019-06-04 DIAGNOSIS — Z88.8: ICD-10-CM

## 2019-06-04 DIAGNOSIS — Z79.899: ICD-10-CM

## 2019-06-04 DIAGNOSIS — Z98.51: ICD-10-CM

## 2019-06-04 DIAGNOSIS — K21.9: ICD-10-CM

## 2019-06-04 DIAGNOSIS — I50.9: ICD-10-CM

## 2019-06-04 PROCEDURE — 99282 EMERGENCY DEPT VISIT SF MDM: CPT
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
## 2019-09-11 ENCOUNTER — HOSPITAL ENCOUNTER (OUTPATIENT)
Dept: HOSPITAL 5 - ED | Age: 69
Setting detail: OBSERVATION
LOS: 1 days | Discharge: HOME | End: 2019-09-12
Attending: INTERNAL MEDICINE | Admitting: INTERNAL MEDICINE
Payer: MEDICARE

## 2019-09-11 DIAGNOSIS — R07.89: Primary | ICD-10-CM

## 2019-09-11 DIAGNOSIS — E66.9: ICD-10-CM

## 2019-09-11 DIAGNOSIS — Z79.82: ICD-10-CM

## 2019-09-11 DIAGNOSIS — I10: ICD-10-CM

## 2019-09-11 DIAGNOSIS — Z87.891: ICD-10-CM

## 2019-09-11 DIAGNOSIS — R11.2: ICD-10-CM

## 2019-09-11 DIAGNOSIS — J45.909: ICD-10-CM

## 2019-09-11 DIAGNOSIS — Z90.710: ICD-10-CM

## 2019-09-11 DIAGNOSIS — E11.9: ICD-10-CM

## 2019-09-11 DIAGNOSIS — I25.10: ICD-10-CM

## 2019-09-11 DIAGNOSIS — Z90.49: ICD-10-CM

## 2019-09-11 DIAGNOSIS — M19.90: ICD-10-CM

## 2019-09-11 LAB
BASOPHILS # (AUTO): 0.1 K/MM3 (ref 0–0.1)
BASOPHILS # (AUTO): 0.1 K/MM3 (ref 0–0.1)
BASOPHILS NFR BLD AUTO: 0.7 % (ref 0–1.8)
BASOPHILS NFR BLD AUTO: 0.8 % (ref 0–1.8)
BUN SERPL-MCNC: 14 MG/DL (ref 7–17)
BUN SERPL-MCNC: 19 MG/DL (ref 7–17)
BUN/CREAT SERPL: 23 %
BUN/CREAT SERPL: 27 %
CALCIUM SERPL-MCNC: 9 MG/DL (ref 8.4–10.2)
CALCIUM SERPL-MCNC: 9.3 MG/DL (ref 8.4–10.2)
EOSINOPHIL # BLD AUTO: 0.1 K/MM3 (ref 0–0.4)
EOSINOPHIL # BLD AUTO: 0.1 K/MM3 (ref 0–0.4)
EOSINOPHIL NFR BLD AUTO: 1.1 % (ref 0–4.3)
EOSINOPHIL NFR BLD AUTO: 1.3 % (ref 0–4.3)
HCT VFR BLD CALC: 34.8 % (ref 30.3–42.9)
HCT VFR BLD CALC: 34.8 % (ref 30.3–42.9)
HEMOLYSIS INDEX: 3
HEMOLYSIS INDEX: 4
HGB BLD-MCNC: 11 GM/DL (ref 10.1–14.3)
HGB BLD-MCNC: 11.3 GM/DL (ref 10.1–14.3)
LYMPHOCYTES # BLD AUTO: 2 K/MM3 (ref 1.2–5.4)
LYMPHOCYTES # BLD AUTO: 2.9 K/MM3 (ref 1.2–5.4)
LYMPHOCYTES NFR BLD AUTO: 28.6 % (ref 13.4–35)
LYMPHOCYTES NFR BLD AUTO: 37.6 % (ref 13.4–35)
MCHC RBC AUTO-ENTMCNC: 32 % (ref 30–34)
MCHC RBC AUTO-ENTMCNC: 32 % (ref 30–34)
MCV RBC AUTO: 71 FL (ref 79–97)
MCV RBC AUTO: 72 FL (ref 79–97)
MONOCYTES # (AUTO): 0.8 K/MM3 (ref 0–0.8)
MONOCYTES # (AUTO): 0.8 K/MM3 (ref 0–0.8)
MONOCYTES % (AUTO): 10.7 % (ref 0–7.3)
MONOCYTES % (AUTO): 10.8 % (ref 0–7.3)
PLATELET # BLD: 302 K/MM3 (ref 140–440)
PLATELET # BLD: 308 K/MM3 (ref 140–440)
RBC # BLD AUTO: 4.81 M/MM3 (ref 3.65–5.03)
RBC # BLD AUTO: 4.88 M/MM3 (ref 3.65–5.03)

## 2019-09-11 PROCEDURE — 80048 BASIC METABOLIC PNL TOTAL CA: CPT

## 2019-09-11 PROCEDURE — 83880 ASSAY OF NATRIURETIC PEPTIDE: CPT

## 2019-09-11 PROCEDURE — 96374 THER/PROPH/DIAG INJ IV PUSH: CPT

## 2019-09-11 PROCEDURE — G0378 HOSPITAL OBSERVATION PER HR: HCPCS

## 2019-09-11 PROCEDURE — 93010 ELECTROCARDIOGRAM REPORT: CPT

## 2019-09-11 PROCEDURE — 82962 GLUCOSE BLOOD TEST: CPT

## 2019-09-11 PROCEDURE — 84484 ASSAY OF TROPONIN QUANT: CPT

## 2019-09-11 PROCEDURE — 71046 X-RAY EXAM CHEST 2 VIEWS: CPT

## 2019-09-11 PROCEDURE — 99284 EMERGENCY DEPT VISIT MOD MDM: CPT

## 2019-09-11 PROCEDURE — 70450 CT HEAD/BRAIN W/O DYE: CPT

## 2019-09-11 PROCEDURE — 85025 COMPLETE CBC W/AUTO DIFF WBC: CPT

## 2019-09-11 PROCEDURE — 96376 TX/PRO/DX INJ SAME DRUG ADON: CPT

## 2019-09-11 PROCEDURE — 93017 CV STRESS TEST TRACING ONLY: CPT

## 2019-09-11 PROCEDURE — 78452 HT MUSCLE IMAGE SPECT MULT: CPT

## 2019-09-11 PROCEDURE — 36415 COLL VENOUS BLD VENIPUNCTURE: CPT

## 2019-09-11 PROCEDURE — A9502 TC99M TETROFOSMIN: HCPCS

## 2019-09-11 PROCEDURE — 93005 ELECTROCARDIOGRAM TRACING: CPT

## 2019-09-11 RX ADMIN — MORPHINE SULFATE PRN MG: 2 INJECTION, SOLUTION INTRAMUSCULAR; INTRAVENOUS at 14:05

## 2019-09-11 RX ADMIN — METOPROLOL TARTRATE SCH: 25 TABLET, FILM COATED ORAL at 22:04

## 2019-09-11 RX ADMIN — HEPARIN SODIUM SCH UNIT: 5000 INJECTION, SOLUTION INTRAVENOUS; SUBCUTANEOUS at 22:00

## 2019-09-11 RX ADMIN — HEPARIN SODIUM SCH: 5000 INJECTION, SOLUTION INTRAVENOUS; SUBCUTANEOUS at 22:03

## 2019-09-11 RX ADMIN — HEPARIN SODIUM SCH: 5000 INJECTION, SOLUTION INTRAVENOUS; SUBCUTANEOUS at 18:16

## 2019-09-11 RX ADMIN — ZOLPIDEM TARTRATE PRN MG: 5 TABLET ORAL at 22:00

## 2019-09-11 RX ADMIN — FAMOTIDINE SCH MG: 20 TABLET ORAL at 21:59

## 2019-09-11 RX ADMIN — METOPROLOL TARTRATE SCH MG: 25 TABLET, FILM COATED ORAL at 21:59

## 2019-09-11 NOTE — HISTORY AND PHYSICAL REPORT
History of Present Illness


Date of examination: 09/11/19


Date of admission: 


09/11/19 10:15





Chief complaint: 





Chest pain


History of present illness: 


68-year-old -American female with past medical history significant for 

hypertension, arthritis, asthma, CAD, arthritis, DM presented to the emergency 

department complaining of intermittent chest pain of 4 days' duration.  Pain is 

on both sides of the chest, pressure-like, 10 out of 10 in intensity at its max,

with no radiation, associated with shortness of breath, and diaphoresis.  Chest 

pain is worsened with exertion and relieved with rest.  Patient has occasional 

cough.  Patient has a temperature of 99.9 which is not fever.  Cardiac enzymes 

were negative, EKG was sinus rhythm.  EDITH score is 4.  Patient will be admitted

to the floor, cardiology consulted, stress test will be ordered.





REVIEW OF SYSTEMS:


GENERAL: no weight change, no fatigue, no fever


HEAD: no head ache


EYES: no blurry vision, no acute visual loss


EARS: no hearing loss, no discharge, no earache


NOSE: no stuffiness, no sneezing, no discharge


MOUTH, THROAT AND NECK: no bleeding gums, no sore throat, no swollen neck


CARDIAC: As stated in the HPI.


RESPIRATORY:+shortness of breath, no wheeze, no cough, no sputum, no hemoptysis,

no asthma


GI: no decreased appetite, no nausea, no vomiting, no dysphagia, no diarrhea, no

constipation, no 


abdominal pain


URINARY: no change in frequency, no urgency, no polyuria, no hematuria, no 

incontinence


MUSCULOSKELETAL: no muscle weakness, no pain, no joint stiffness


NEUROLOGIC: no loss of sensation/numbness, no tingling, no tremors, no 

weakness/paralysis


HEMATOLOGIC: no anemia, no easy bruising


SKIN: no rashes


ENDOCRINE: no heat/cold intolerance, no polyuria, no polydipsia, no thyroid 

problems, no diabetes


PSYCHIATRIC: no anxiety, no depression, no suicidal ideations











Past History


Past Medical History: arthritis, CAD, diabetes, hypertension


Past Surgical History: cholecystectomy, hysterectomy


Social history: smoking (quit 5 months ago), full code.  denies: alcohol abuse, 

prescription drug abuse, IV drug use


Family history: CAD (father and sister), cancer (colon, monther and sister)





Medications and Allergies


                                    Allergies











Allergy/AdvReac Type Severity Reaction Status Date / Time


 


azithromycin [From Zithromax] Allergy  Rash Verified 06/04/19 14:35


 


butorphanol tartrate Allergy  Rash Verified 06/04/19 14:35





[From Stadol]     


 


cyclobenzaprine Allergy  Unknown Verified 09/11/19 06:36





[From Flexeril]     


 


cyclobenzaprine HCl Allergy  Seizure Verified 06/04/19 14:35





[From Flexeril]     


 


ibuprofen [From Motrin] Allergy  Vomiting Verified 09/11/19 06:36


 


meperidine HCl [From Demerol] Allergy  Rash Verified 06/04/19 14:35


 


naproxen Allergy  Seizure Verified 06/04/19 14:35


 


tramadol Allergy  Unknown Verified 09/11/19 06:36


 


acetaminophen [From Tylenol] AdvReac  Nausea Verified 06/04/19 14:35











                                Home Medications











 Medication  Instructions  Recorded  Confirmed  Last Taken  Type


 


hydroCHLOROthiazide [HCTZ] 25 mg PO DAILY 04/25/15 09/11/19 09/10/19 History


 


ALBUTEROL Inhaler (OR & NICU) 2 puff IH QID PRN #1 inhalation 01/14/18 09/11/19 

09/10/19 Rx





[ProAir HFA Inhaler]     


 


Aspirin [Aspirin BABY CHEW TAB] 81 mg PO ONCE 01/29/18 09/11/19 09/10/19 History


 


Famotidine [Pepcid] 20 mg PO BID #60 tablet 01/31/18 09/11/19 09/10/19 Rx


 


Promethazine [Phenergan SUPPOS] 25 mg [place] Q6HR PRN #10 supp.rect 06/27/18 

09/11/19 09/10/19 Rx











Active Meds: 


Active Medications





Albuterol (Proair)  2 puff IH QID PRN


   PRN Reason: Shortness Of Breath


Aspirin (Baby Aspirin)  81 mg PO ONCE IVAN


Aspirin (Baby Aspirin)  81 mg PO QDAY IVAN


Atorvastatin Calcium (Lipitor)  40 mg PO QHS IVAN


Benzonatate (Tessalon Perles)  100 mg PO TID PRN


   PRN Reason: Cough


Famotidine (Pepcid)  20 mg PO BID IVAN


Metoprolol Tartrate (Lopressor)  25 mg PO BID IVAN


Morphine Sulfate (Morphine)  2 mg IV Q4H PRN


   PRN Reason: Chest Pain unrelieved by NTG


Nitroglycerin (Nitrostat)  0.4 mg SL Q5M PRN


   PRN Reason: Chest Pain


Sodium Chloride (Sodium Chloride Flush Syringe 10 Ml)  10 ml IV PRN PRN


   PRN Reason: LINE FLUSH


Tramadol HCl (Ultram)  50 mg PO Q6HR PRN


   PRN Reason: Pain


Zolpidem Tartrate (Ambien)  5 mg PO QHS PRN


   PRN Reason: Sleep











Exam





- Physical Exam


Narrative exam: 


 Not in cardiopulmonary distress. 


 The patient is obese.


 Vital signs as documented.


 Head exam is unremarkable.


 No scleral icterus .


 Neck is without jugular venous distension, thyromegaly, or carotid bruits. 


 Lungs are clear to auscultation.


Cardiac exam reveals regular rate and  Rhythm. First and second heart sounds 

normal. No murmurs, rubs or gallops. 


Abdominal exam reveals normal bowel sounds, no masses, no organomegaly and no 

aortic enlargement. 


Extremities are nonedematous and both femoral and pedal pulses are normal.


CNS: Alert and oriented 3.  No focal weakness.





- Constitutional


Vitals: 


                                        











Temp Pulse Resp BP Pulse Ox


 


 98.3 F   72   13   112/51   98 


 


 09/11/19 06:32  09/11/19 10:39  09/11/19 10:39  09/11/19 10:39  09/11/19 10:39














Results





- Labs


CBC & Chem 7: 


                                 09/11/19 11:14





                                 09/11/19 11:14


Labs: 


                             Laboratory Last Values











WBC  7.8 K/mm3 (4.5-11.0)   09/11/19  06:52    


 


RBC  4.81 M/mm3 (3.65-5.03)   09/11/19  06:52    


 


Hgb  11.0 gm/dl (10.1-14.3)   09/11/19  06:52    


 


Hct  34.8 % (30.3-42.9)   09/11/19  06:52    


 


MCV  72 fl (79-97)  L  09/11/19  06:52    


 


MCH  23 pg (28-32)  L  09/11/19  06:52    


 


MCHC  32 % (30-34)   09/11/19  06:52    


 


RDW  18.1 % (13.2-15.2)  H  09/11/19  06:52    


 


Plt Count  308 K/mm3 (140-440)   09/11/19  06:52    


 


Lymph % (Auto)  37.6 % (13.4-35.0)  H  09/11/19  06:52    


 


Mono % (Auto)  10.7 % (0.0-7.3)  H  09/11/19  06:52    


 


Eos % (Auto)  1.3 % (0.0-4.3)   09/11/19  06:52    


 


Baso % (Auto)  0.7 % (0.0-1.8)   09/11/19  06:52    


 


Lymph #  2.9 K/mm3 (1.2-5.4)   09/11/19  06:52    


 


Mono #  0.8 K/mm3 (0.0-0.8)   09/11/19  06:52    


 


Eos #  0.1 K/mm3 (0.0-0.4)   09/11/19  06:52    


 


Baso #  0.1 K/mm3 (0.0-0.1)   09/11/19  06:52    


 


Seg Neutrophils %  49.7 % (40.0-70.0)   09/11/19  06:52    


 


Seg Neutrophils #  3.8 K/mm3 (1.8-7.7)   09/11/19  06:52    


 


Sodium  138 mmol/L (137-145)   09/11/19  06:52    


 


Potassium  3.9 mmol/L (3.6-5.0)   09/11/19  06:52    


 


Chloride  101.5 mmol/L ()   09/11/19  06:52    


 


Carbon Dioxide  28 mmol/L (22-30)   09/11/19  06:52    


 


  12 mmol/L  09/11/19  06:52    


 


BUN  19 mg/dL (7-17)  H  09/11/19  06:52    


 


  0.7 mg/dL (0.7-1.2)   09/11/19  06:52    


 


Estimated GFR  > 60 ml/min  09/11/19  06:52    


 


  27 %  09/11/19  06:52    


 


Glucose  105 mg/dL ()  H  09/11/19  06:52    


 


Calcium  9.0 mg/dL (8.4-10.2)   09/11/19  06:52    


 


  < 0.010 ng/mL (0.00-0.029)   09/11/19  09:20    


 


NT-Pro-B Natriuret Pep  41.76 pg/mL (0-900)   09/11/19  09:24    














Assessment and Plan


Assessment and plan: 





Chest pain


-Cardiac enzymes were negative, EKG was normal


- EDITH score 4


- Cardiology consulted and stress test ordered


- Pain control


Hypertension


- Continue medication


Asthma


-Breathing treatment as needed


Diabetes mellitus


- ADA diet, Accu-Chek


- Blood Sugar  is normal continue to monitor, start sliding scale insulin if 

needed


DVT prophylaxis


- On heparin


Disposition


- Admit to medical floor for observation


Advance Directives: Yes


VTE prophylaxis?: Chemical


Plan of care discussed with patient/family: Yes

## 2019-09-11 NOTE — CONSULTATION
History of Present Illness


Consult date: 09/11/19


Requesting physician: JENNIFER SANCHEZ


Consult reason: chest pain


History of present illness: 


The patient is a 68 year old female with a past medical history of reported AMI 

(one in 2005 with subsequent LHC - no intervention required - at [facility] 

per pt report) HTN, DM and recurrent atypical chest pain. She has been seen by 

our practice on prior hospitalizations but has not been compliant with OP follow

up. She presented with c/o chest pain since Saturday. She describes her pain as 

an intermittent nonexertional stabbing pain which occurs under her left breast 

and on the right side of her chest and sometimes in her epigastric area. The 

pain is somewhat reproducible with palpation of the chest wall. The pain was 

associated with some nausea and vomiting this AM. She denies any SOB, 

palpitations, diaphoresis, dizziness or syncope. She also c/o headache. 





Echo done 10/2017 showed EF 55-60%. 


Lexiscan MPI stress test done 10/2017 was negative. 


LHC done 10/2018 showed normal coronaries.








Past History


Past Medical History: diabetes, hypertension





Medications and Allergies


                                    Allergies











Allergy/AdvReac Type Severity Reaction Status Date / Time


 


azithromycin [From Zithromax] Allergy  Rash Verified 06/04/19 14:35


 


butorphanol tartrate Allergy  Rash Verified 06/04/19 14:35





[From Stadol]     


 


cyclobenzaprine Allergy  Unknown Verified 09/11/19 06:36





[From Flexeril]     


 


cyclobenzaprine HCl Allergy  Seizure Verified 06/04/19 14:35





[From Flexeril]     


 


ibuprofen [From Motrin] Allergy  Vomiting Verified 09/11/19 06:36


 


meperidine HCl [From Demerol] Allergy  Rash Verified 06/04/19 14:35


 


naproxen Allergy  Seizure Verified 06/04/19 14:35


 


tramadol Allergy  Unknown Verified 09/11/19 06:36


 


acetaminophen [From Tylenol] AdvReac  Nausea Verified 06/04/19 14:35











                                Home Medications











 Medication  Instructions  Recorded  Confirmed  Last Taken  Type


 


hydroCHLOROthiazide [HCTZ] 25 mg PO DAILY 04/25/15 09/11/19 09/10/19 History


 


ALBUTEROL Inhaler (OR & NICU) 2 puff IH QID PRN #1 inhalation 01/14/18 09/11/19 

09/10/19 Rx





[ProAir HFA Inhaler]     


 


Aspirin [Aspirin BABY CHEW TAB] 81 mg PO ONCE 01/29/18 09/11/19 09/10/19 History


 


Famotidine [Pepcid] 20 mg PO BID #60 tablet 01/31/18 09/11/19 09/10/19 Rx


 


Promethazine [Phenergan SUPPOS] 25 mg NE Q6HR PRN #10 supp.rect 06/27/18 09/11

/19 09/10/19 Rx











Active Meds: 


Active Medications





Albuterol (Proventil)  2.5 mg IH Q4HRT PRN


   PRN Reason: Shortness Of Breath


Aspirin (Baby Aspirin)  81 mg PO ONCE IVAN


Aspirin (Baby Aspirin)  81 mg PO QDAY IVAN


Atorvastatin Calcium (Lipitor)  40 mg PO QHS IVAN


Benzonatate (Tessalon Perles)  100 mg PO TID PRN


   PRN Reason: Cough


Famotidine (Pepcid)  20 mg PO BID IVAN


Metoprolol Tartrate (Lopressor)  25 mg PO BID IVAN


Morphine Sulfate (Morphine)  2 mg IV Q4H PRN


   PRN Reason: Chest Pain unrelieved by NTG


Nitroglycerin (Nitrostat)  0.4 mg SL Q5M PRN


   PRN Reason: Chest Pain


Sodium Chloride (Sodium Chloride Flush Syringe 10 Ml)  10 ml IV PRN PRN


   PRN Reason: LINE FLUSH


   Stop: 09/21/19 10:59


Tramadol HCl (Ultram)  50 mg PO Q6HR PRN


   PRN Reason: Pain


Zolpidem Tartrate (Ambien)  5 mg PO QHS PRN


   PRN Reason: Sleep











Review of Systems


Constitutional: no weight loss, no weight gain, no fever, no chills, no sweats


Ears, nose, mouth and throat: no ear pain, no nose pain, no sinus pressure, no 

sinus pain


Cardiovascular: chest pain, high blood pressure, no orthopnea, no palpitations, 

no rapid/irregular heart beat, no edema, no syncope, no lightheadedness, no 

shortness of breath, no dyspnea on exertion, no leg edema


Respiratory: no cough, no shortness of breath, no dyspnea on exertion, no 

congestion, no wheezing, no pain on inspiration


Gastrointestinal: nausea, vomiting, no abdominal pain, no diarrhea, no 

constipation, no change in bowel habits


Genitourinary Female: no pelvic pain, no flank pain, no dysuria, no urinary 

frequency, no urgency


Musculoskeletal: no neck stiffness, no neck pain, no shooting arm pain, no arm 

numbness/tingling, no low back pain, no shooting leg pain


Integumentary: no rash, no pruritis, no redness, no sores, no wounds


Neurological: no head injury, no paralysis, no weakness, no parathesias, no 

numbness, no tingling, no seizures, no syncope


Psychiatric: no anxiety


Endocrine: no cold intolerance, no heat intolerance


Hematologic/Lymphatic: no easy bruising, no easy bleeding


Allergic/Immunologic: no urticaria, no wheezing





Physical Examination


                                   Vital Signs











Temp Pulse Resp BP Pulse Ox


 


 98.3 F   77   16   143/62   98 


 


 09/11/19 06:32  09/11/19 06:32  09/11/19 06:32  09/11/19 06:32  09/11/19 06:32











General appearance: no acute distress


HEENT: Positive: PERRL, Normocephaly, Mucus Membranes Moist


Neck: Positive: neck supple, trachea midline


Cardiac: Positive: Reg Rate and Rhythm, S1/S2


Lungs: Positive: Decreased Breath Sounds


Neuro: Positive: Grossly Intact


Abdomen: Negative: Tender


Skin: Negative: Rash


Musculoskeletal: No Pain


Extremities: Absent: edema





Results





                                 09/11/19 11:14





                                 09/11/19 11:14


                                       CBC











  09/11/19 09/11/19 Range/Units





  06:52 11:14 


 


WBC  7.8  7.0  (4.5-11.0)  K/mm3


 


RBC  4.81  4.88  (3.65-5.03)  M/mm3


 


Hgb  11.0  11.3  (10.1-14.3)  gm/dl


 


Hct  34.8  34.8  (30.3-42.9)  %


 


Plt Count  308  302  (140-440)  K/mm3


 


Lymph #  2.9  2.0  (1.2-5.4)  K/mm3


 


Mono #  0.8  0.8  (0.0-0.8)  K/mm3


 


Eos #  0.1  0.1  (0.0-0.4)  K/mm3


 


Baso #  0.1  0.1  (0.0-0.1)  K/mm3








                          Comprehensive Metabolic Panel











  09/11/19 09/11/19 Range/Units





  06:52 11:14 


 


Sodium  138  137  (137-145)  mmol/L


 


Potassium  3.9  4.1  (3.6-5.0)  mmol/L


 


Chloride  101.5  98.3  ()  mmol/L


 


Carbon Dioxide  28  28  (22-30)  mmol/L


 


BUN  19 H  14  (7-17)  mg/dL


 


Creatinine  0.7  0.6 L  (0.7-1.2)  mg/dL


 


Glucose  105 H  103 H  ()  mg/dL


 


Calcium  9.0  9.3  (8.4-10.2)  mg/dL














- Imaging and Cardiology


Echo: report reviewed (10/2017 showed EF 55-60%. )


Cardiac cath: report reviewed ( 10/2018 showed normal coronaries.)


EKG: report reviewed, image reviewed





EKG interpretations





- Telemetry


EKG Rhythm: Sinus Rhythm





- EKG


Sinus rhythms and dysrhythmias: sinus rhythm





Assessment and Plan


Assessment:


Recurrent atypical chest pain


H/o normal coronaries


Reported h/o AMI


HTN


DM


H/o asthma


Obesity





Plan:


Pt presented with recurrent atypical chest pain. LHC done 10/2018 showed normal 

coronaries. ECG with NAF, Zina negative for AMI x 2 sets. Will plan for lexiscan 

MPI stress test in AM. NPO after MN. 


Obtain echo. 


Further recs to follow per hospital course.





The patient has been seen in conjunction with Dr. CRUZ Krause who agrees with the 

assessment and plan of care.

## 2019-09-11 NOTE — XRAY REPORT
CHEST 2 VIEWS 



INDICATION:  Chest Pain.



COMPARISON:  2/11/2019 report. The images are not available.



FINDINGS:

Support devices: None.



Heart: Within normal limits. 

Lungs/pleura: No acute air space or interstitial disease.  No evidence for pleural effusion or pneumo
thorax. Mild central pulmonary venous congestion is suspected.



Additional findings: None.



IMPRESSION:

Mild pulmonary venous congestion.



Signer Name: Jose Hummel Jr, MD 

Signed: 9/11/2019 9:00 AM

 Workstation Name: [id number]
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
## 2019-09-11 NOTE — CAT SCAN REPORT
CT HEAD WITHOUT CONTRAST



INDICATION : headache.



TECHNIQUE:  Axial imaging performed from the skull apex through the skull base without the use of con
trast.  All CT scans at this location are performed using CT dose reduction for ALARA by means of aut
omated exposure control. 



COMPARISON:  10/26/2018



FINDINGS:  



Parenchyma:  No acute intracranial hemorrhage or parenchymal abnormality.

Ventricles:  Ventricles are normal in size and appear symmetric.   

Soft tissues:  Soft tissues including the orbits appear normal.   

Bones:  No acute osseous abnormality.   

Sinuses:  Sinuses and mastoid air cells are clear.





IMPRESSION: Normal study.



Signer Name: Jordan Joseph MD 

Signed: 9/11/2019 9:53 AM

 Workstation Name: [id number]

## 2019-09-12 VITALS — DIASTOLIC BLOOD PRESSURE: 60 MMHG | SYSTOLIC BLOOD PRESSURE: 130 MMHG

## 2019-09-12 RX ADMIN — HEPARIN SODIUM SCH: 5000 INJECTION, SOLUTION INTRAVENOUS; SUBCUTANEOUS at 14:50

## 2019-09-12 RX ADMIN — MORPHINE SULFATE PRN MG: 2 INJECTION, SOLUTION INTRAMUSCULAR; INTRAVENOUS at 12:44

## 2019-09-12 RX ADMIN — FAMOTIDINE SCH MG: 20 TABLET ORAL at 11:15

## 2019-09-12 RX ADMIN — METOPROLOL TARTRATE SCH MG: 25 TABLET, FILM COATED ORAL at 11:16

## 2019-09-12 RX ADMIN — ZOLPIDEM TARTRATE PRN MG: 5 TABLET ORAL at 11:16

## 2019-09-12 RX ADMIN — HEPARIN SODIUM SCH: 5000 INJECTION, SOLUTION INTRAVENOUS; SUBCUTANEOUS at 08:01

## 2019-09-12 NOTE — TREADMILL REPORT
REFERRING PHYSICIAN:  Hospitalist Service.



PROCEDURE:  Nuclear perfusion scan.



PROTOCOL:  The patient was brought to the stress lab in a postoperative state,

given 10 mCi of technetium 99m at rest.  The patient underwent rest imaging. 

The patient underwent Lexiscan stress test.  At peak stress, the patient was

given 26 mCi of technetium 99m.  Shortly thereafter, the patient underwent

stress imaging.  Raw imaging reveals mild GI artifact.  No significant motion

artifact.  SPECT images examined carefully in horizontal long axis, vertical

long axis, short axis views.  There is normal homogenous uptake of radioisotope

in all reported segments.  No evidence of a significant fixed or reversible

perfusion defect suggestive of prior infarction or ischemia.  Gated wall motion

reveals normal systolic thickening, calculated ejection fraction of 68%, no TID.



CONCLUSIONS:

1.  Normal myocardial perfusion scan without evidence of active ischemia or

prior infarction.

2.  Normal left ventricular systolic performance without evidence of transient

ischemic dilatation or stress-induced segmental wall motion abnormalities.

3.  Normal Lexiscan stress test without evidence of diagnostic ST changes;

arrhythmias or chest pain during stress or recovery.





DD: 09/12/2019 10:46

DT: 09/12/2019 23:03

JOB# [id number]  [id number]

JOSE/JOLANTA

## 2019-09-12 NOTE — PROGRESS NOTE
Assessment and Plan


Assessment:


Recurrent atypical chest pain - currently resolved


H/o normal coronaries - LHC done 10/2018 showed normal coronaries


Reported h/o AMI


HTN


DM


H/o asthma


Obesity





Plan:


S/p lexiscan MPI stress test this AM which was negative. 


Currently stable cardiac status. Pt may discharge home from cardiology 

standpoint. Recommend follow up in our office with Dr. Krause within 1-2 weeks of

hospital discharge ([phone]). 





The patient has been seen in conjunction with Dr. CRUZ Krause who agrees with the 

assessment and plan of care.








Subjective


Date of service: 09/12/19


Principal diagnosis: cp


Interval history: 


pt for stress test, no current complaints.








Objective





                                Last Vital Signs











Temp  97.6 F   09/12/19 03:53


 


Pulse  74   09/12/19 03:53


 


Resp  18   09/12/19 03:53


 


BP  118/63   09/12/19 03:53


 


Pulse Ox  95   09/12/19 03:53

















- Physical Examination


General: No Apparent Distress


HEENT: Positive: PERRL, Normocephaly, Mucus Membranes Moist


Neck: Positive: neck supple, trachea midline


Cardiac: Positive: Reg Rate and Rhythm, S1/S2


Lungs: Positive: Decreased Breath Sounds


Neuro: Positive: Grossly Intact


Abdomen: Negative: Tender


Skin: Negative: Rash


Musculoskeletal: No Pain


Extremities: Absent: edema





- Labs and Meds


                                       CBC











  09/11/19 Range/Units





  11:14 


 


WBC  7.0  (4.5-11.0)  K/mm3


 


RBC  4.88  (3.65-5.03)  M/mm3


 


Hgb  11.3  (10.1-14.3)  gm/dl


 


Hct  34.8  (30.3-42.9)  %


 


Plt Count  302  (140-440)  K/mm3


 


Lymph #  2.0  (1.2-5.4)  K/mm3


 


Mono #  0.8  (0.0-0.8)  K/mm3


 


Eos #  0.1  (0.0-0.4)  K/mm3


 


Baso #  0.1  (0.0-0.1)  K/mm3








                          Comprehensive Metabolic Panel











  09/11/19 Range/Units





  11:14 


 


Sodium  137  (137-145)  mmol/L


 


Potassium  4.1  (3.6-5.0)  mmol/L


 


Chloride  98.3  ()  mmol/L


 


Carbon Dioxide  28  (22-30)  mmol/L


 


BUN  14  (7-17)  mg/dL


 


Creatinine  0.6 L  (0.7-1.2)  mg/dL


 


Glucose  103 H  ()  mg/dL


 


Calcium  9.3  (8.4-10.2)  mg/dL














- Imaging and Cardiology


EKG: report reviewed, image reviewed


Echo: report reviewed (10/2017 showed EF 55-60%. )


Cardiac cath: report reviewed ( 10/2018 showed normal coronaries.)





- Telemetry


EKG Rhythm: Sinus Rhythm





- EKG


Sinus rhythms and dysrhythmias: sinus rhythm

## 2019-09-12 NOTE — DISCHARGE SUMMARY
Providers





- Providers


Date of Admission: 


09/11/19 10:15





Date of discharge: 09/12/19


Attending physician: 


JENNIFER SANCHEZ MD





                                        





09/11/19


Consult to Cardiac Rehabilitation [CONS] Routine 


   Reason For Exam: Phase I





09/11/19 11:03


Consult to Cardiology [CONS] Routine 


   Consulting Provider: ESTELA HEARD


   Reason For Exam: recurrent chest pain











Primary care physician: 


SHONDA TORREZ








Hospitalization


Reason for admission: chest pain


Condition: Good


Pertinent studies: 





stress test


Hospital course: 





68-year-old -American female with past medical history significant for 

hypertension, arthritis, asthma, CAD, arthritis, DM presented to the emergency 

department complaining of intermittent chest pain of 4 days' duration.  Pain is 

on both sides of the chest, pressure-like, 10 out of 10 in intensity at its max,

 with no radiation, associated with shortness of breath, and diaphoresis.  Chest

 pain is worsened with exertion and relieved with rest.  Patient has occasional 

cough.  Patient has a temperature of 99.9 which is not fever.  Cardiac enzymes 

were negative, EKG was sinus rhythm.  EDITH score is 4.  Patient will be admitted

 to the floor, cardiology consulted, stress test will be ordered.


   Patient admitted to the floor and cardiac enzymes were negative, ECG NSR and 

stress test was done and was negative. Chest pain resolved. Patient discharged 

home with the advice to have f/u with PCP and cardiology. patient was 

hemodynamically stable at the time of discharge.


Disposition: DC-01 TO HOME OR SELFCARE


Time spent for discharge: 32 minutes





- Discharge Diagnoses


(1) Atypical chest pain


Status: Acute   Comment: due to GERD   





(2) Asthma


Status: Chronic   


Qualifiers: 


   Asthma persistence: unspecified 





(3) Chronic back pain


Status: Chronic   





(4) Diabetes


Status: Chronic   





(5) HTN (hypertension)


Status: Chronic   


Qualifiers: 


   Hypertension type: essential hypertension   Qualified Code(s): I10 - 

Essential (primary) hypertension   





(6) Obesity (BMI 30-39.9)


Status: Chronic   





(7) History of coronary artery disease


Status: Suspected   





Core Measure Documentation





- Palliative Care


Palliative Care/ Comfort Measures: Not Applicable





- Core Measures


Any of the following diagnoses?: none





Exam





- Physical Exam


Narrative exam: 


 Not in cardiopulmonary distress. 


 The patient is obese.


 Vital signs as documented.


 Head exam is unremarkable.


 No scleral icterus .


 Neck is without jugular venous distension, thyromegaly, or carotid bruits. 


 Lungs are clear to auscultation.


Cardiac exam reveals regular rate and  Rhythm. First and second heart sounds 

normal. No murmurs, rubs or gallops. 


Abdominal exam reveals normal bowel sounds, no masses, no organomegaly and no 

aortic enlargement. 


Extremities are nonedematous and both femoral and pedal pulses are normal.


CNS: Alert and oriented 3.  No focal weakness.





- Constitutional


Vitals: 


                                        











Temp Pulse Resp BP Pulse Ox


 


 97.6 F   82   18   130/60   95 


 


 09/12/19 03:53  09/12/19 11:16  09/12/19 03:53  09/12/19 11:16  09/12/19 03:53














Plan


Activity: no restrictions


Weight Bearing Status: Full Weight Bearing


Diet: low cholesterol, low salt


Follow up with: 


SHONDA TORREZ MD [Primary Care Provider] - 3-5 Days


ESTELA HEARD MD [Staff Physician] - 10 Days


Prescriptions: 


AtorvaSTATin [Lipitor] 40 mg PO QHS #30 tablet


Aspirin [Aspirin BABY CHEW TAB] 81 mg PO ONCE #30 tab.chew


metFORMIN [Glucophage] 500 mg PO BID #60 tablet


Famotidine [Pepcid] 20 mg PO BID #30 tablet

## 2019-11-24 ENCOUNTER — HOSPITAL ENCOUNTER (EMERGENCY)
Dept: HOSPITAL 5 - ED | Age: 69
Discharge: HOME | End: 2019-11-24
Payer: MEDICARE

## 2019-11-24 VITALS — DIASTOLIC BLOOD PRESSURE: 60 MMHG | SYSTOLIC BLOOD PRESSURE: 132 MMHG

## 2019-11-24 DIAGNOSIS — Z88.8: ICD-10-CM

## 2019-11-24 DIAGNOSIS — J20.8: ICD-10-CM

## 2019-11-24 DIAGNOSIS — Z88.1: ICD-10-CM

## 2019-11-24 DIAGNOSIS — Z88.6: ICD-10-CM

## 2019-11-24 DIAGNOSIS — I11.0: ICD-10-CM

## 2019-11-24 DIAGNOSIS — I50.9: ICD-10-CM
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
## 2019-11-24 DIAGNOSIS — R07.89: ICD-10-CM

## 2019-11-24 DIAGNOSIS — Z90.710: ICD-10-CM

## 2019-11-24 DIAGNOSIS — Z79.899: ICD-10-CM
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
## 2019-11-24 DIAGNOSIS — M19.90: ICD-10-CM

## 2019-11-24 DIAGNOSIS — Z98.51: ICD-10-CM
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
## 2019-11-24 DIAGNOSIS — F17.200: ICD-10-CM

## 2019-11-24 DIAGNOSIS — S76.012A: Primary | ICD-10-CM

## 2019-11-24 DIAGNOSIS — K21.9: ICD-10-CM
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
## 2019-11-24 DIAGNOSIS — I25.2: ICD-10-CM

## 2019-11-24 LAB
ALBUMIN SERPL-MCNC: 4.3 G/DL (ref 3.9–5)
ALT SERPL-CCNC: 16 UNITS/L (ref 7–56)
APTT BLD: 28.1 SEC. (ref 24.2–36.6)
BACTERIA #/AREA URNS HPF: (no result) /HPF
BASOPHILS # (AUTO): 0 K/MM3 (ref 0–0.1)
BASOPHILS NFR BLD AUTO: 0.6 % (ref 0–1.8)
BILIRUB UR QL STRIP: (no result)
BLOOD UR QL VISUAL: (no result)
BUN SERPL-MCNC: 13 MG/DL (ref 7–17)
BUN/CREAT SERPL: 19 %
CALCIUM SERPL-MCNC: 9.5 MG/DL (ref 8.4–10.2)
EOSINOPHIL # BLD AUTO: 0.1 K/MM3 (ref 0–0.4)
EOSINOPHIL NFR BLD AUTO: 1.4 % (ref 0–4.3)
HCT VFR BLD CALC: 36.9 % (ref 30.3–42.9)
HEMOLYSIS INDEX: 3
HGB BLD-MCNC: 11.6 GM/DL (ref 10.1–14.3)
INR PPP: 1.01 (ref 0.87–1.13)
LYMPHOCYTES # BLD AUTO: 2.3 K/MM3 (ref 1.2–5.4)
LYMPHOCYTES NFR BLD AUTO: 37.1 % (ref 13.4–35)
MCHC RBC AUTO-ENTMCNC: 31 % (ref 30–34)
MCV RBC AUTO: 72 FL (ref 79–97)
MONOCYTES # (AUTO): 0.7 K/MM3 (ref 0–0.8)
MONOCYTES % (AUTO): 11.5 % (ref 0–7.3)
MUCOUS THREADS #/AREA URNS HPF: (no result) /HPF
PH UR STRIP: 5 [PH] (ref 5–7)
PLATELET # BLD: 323 K/MM3 (ref 140–440)
PROT UR STRIP-MCNC: (no result) MG/DL
RBC # BLD AUTO: 5.14 M/MM3 (ref 3.65–5.03)
RBC #/AREA URNS HPF: 5 /HPF (ref 0–6)
UROBILINOGEN UR-MCNC: < 2 MG/DL (ref ?–2)
WBC #/AREA URNS HPF: 1 /HPF (ref 0–6)

## 2019-11-24 PROCEDURE — 73502 X-RAY EXAM HIP UNI 2-3 VIEWS: CPT

## 2019-11-24 PROCEDURE — 83690 ASSAY OF LIPASE: CPT

## 2019-11-24 PROCEDURE — 81001 URINALYSIS AUTO W/SCOPE: CPT

## 2019-11-24 PROCEDURE — 36415 COLL VENOUS BLD VENIPUNCTURE: CPT

## 2019-11-24 PROCEDURE — 84484 ASSAY OF TROPONIN QUANT: CPT

## 2019-11-24 PROCEDURE — 85730 THROMBOPLASTIN TIME PARTIAL: CPT

## 2019-11-24 PROCEDURE — 85025 COMPLETE CBC W/AUTO DIFF WBC: CPT

## 2019-11-24 PROCEDURE — 71045 X-RAY EXAM CHEST 1 VIEW: CPT

## 2019-11-24 PROCEDURE — 93010 ELECTROCARDIOGRAM REPORT: CPT

## 2019-11-24 PROCEDURE — 99284 EMERGENCY DEPT VISIT MOD MDM: CPT

## 2019-11-24 PROCEDURE — 96374 THER/PROPH/DIAG INJ IV PUSH: CPT

## 2019-11-24 PROCEDURE — 93005 ELECTROCARDIOGRAM TRACING: CPT

## 2019-11-24 PROCEDURE — 80053 COMPREHEN METABOLIC PANEL: CPT

## 2019-11-24 PROCEDURE — 85610 PROTHROMBIN TIME: CPT

## 2019-11-24 PROCEDURE — 96375 TX/PRO/DX INJ NEW DRUG ADDON: CPT
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
## 2019-11-24 NOTE — XRAY REPORT
LEFT HIP 2 VIEWS



INDICATION / CLINICAL INFORMATION:

Left hip pain.



COMPARISON:

None available.

 

FINDINGS:



BONES and JOINT(S): No acute fracture or subluxation. No significant arthritis.

SOFT TISSUES: No significant abnormality.



ADDITIONAL FINDINGS: None.



IMPRESSION:

No significant abnormality of the left hip.



Signer Name: Hipolito Sims MD 

Signed: 11/24/2019 11:18 AM

 Workstation Name: [id number]

## 2019-11-24 NOTE — XRAY REPORT
CHEST 1 VIEW 11/24/2019 10:14 AM



INDICATION / CLINICAL INFORMATION:

Chest Pain. Cough.



COMPARISON: 

2 views of the chest from 9/11/2019.



FINDINGS:



SUPPORT DEVICES: None.

HEART / MEDIASTINUM: Cardiac size is normal with stable prominence of the central vasculature. 

LUNGS / PLEURA: There is probable bibasilar atelectasis. The upper lungs are clear. No significant pl
eural effusion. No pneumothorax. 



ADDITIONAL FINDINGS: No significant additional findings.



IMPRESSION:

1. Probable bibasilar atelectasis.

2. Stable prominence of the central vasculature.



Signer Name: Hipolito Sims MD 

Signed: 11/24/2019 11:15 AM

 Workstation Name: [id number]

## 2020-01-15 ENCOUNTER — HOSPITAL ENCOUNTER (EMERGENCY)
Dept: HOSPITAL 5 - ED | Age: 70
Discharge: HOME | End: 2020-01-15
Payer: MEDICARE

## 2020-01-15 VITALS — DIASTOLIC BLOOD PRESSURE: 75 MMHG | SYSTOLIC BLOOD PRESSURE: 151 MMHG

## 2020-01-15 DIAGNOSIS — Z88.8: ICD-10-CM

## 2020-01-15 DIAGNOSIS — G89.29: ICD-10-CM

## 2020-01-15 DIAGNOSIS — N64.4: Primary | ICD-10-CM

## 2020-01-15 DIAGNOSIS — M54.9: ICD-10-CM

## 2020-01-15 DIAGNOSIS — Z98.890: ICD-10-CM

## 2020-01-15 DIAGNOSIS — Z88.1: ICD-10-CM

## 2020-01-15 LAB
BILIRUB UR QL STRIP: (no result)
BLOOD UR QL VISUAL: (no result)
MUCOUS THREADS #/AREA URNS HPF: (no result) /HPF
PH UR STRIP: 7 [PH] (ref 5–7)
PROT UR STRIP-MCNC: (no result) MG/DL
RBC #/AREA URNS HPF: 1 /HPF (ref 0–6)
UROBILINOGEN UR-MCNC: < 2 MG/DL (ref ?–2)
WBC #/AREA URNS HPF: 1 /HPF (ref 0–6)

## 2020-01-15 PROCEDURE — 99283 EMERGENCY DEPT VISIT LOW MDM: CPT

## 2020-01-15 PROCEDURE — 96372 THER/PROPH/DIAG INJ SC/IM: CPT

## 2020-01-15 PROCEDURE — 81001 URINALYSIS AUTO W/SCOPE: CPT

## 2020-01-15 PROCEDURE — 87086 URINE CULTURE/COLONY COUNT: CPT

## 2020-04-13 ENCOUNTER — HOSPITAL ENCOUNTER (EMERGENCY)
Dept: HOSPITAL 5 - ED | Age: 70
Discharge: HOME | End: 2020-04-13
Payer: MEDICARE

## 2020-04-13 VITALS — SYSTOLIC BLOOD PRESSURE: 127 MMHG | DIASTOLIC BLOOD PRESSURE: 72 MMHG

## 2020-04-13 DIAGNOSIS — Z87.891: ICD-10-CM

## 2020-04-13 DIAGNOSIS — Z79.899: ICD-10-CM

## 2020-04-13 DIAGNOSIS — I20.8: Primary | ICD-10-CM

## 2020-04-13 DIAGNOSIS — I11.0: ICD-10-CM

## 2020-04-13 DIAGNOSIS — Z87.442: ICD-10-CM

## 2020-04-13 DIAGNOSIS — Z90.49: ICD-10-CM

## 2020-04-13 DIAGNOSIS — I25.2: ICD-10-CM

## 2020-04-13 DIAGNOSIS — Z98.51: ICD-10-CM

## 2020-04-13 DIAGNOSIS — M19.90: ICD-10-CM

## 2020-04-13 DIAGNOSIS — Z90.710: ICD-10-CM

## 2020-04-13 DIAGNOSIS — Z88.8: ICD-10-CM

## 2020-04-13 DIAGNOSIS — Z98.890: ICD-10-CM

## 2020-04-13 DIAGNOSIS — I50.9: ICD-10-CM

## 2020-04-13 DIAGNOSIS — K21.9: ICD-10-CM

## 2020-04-13 DIAGNOSIS — J45.909: ICD-10-CM

## 2020-04-13 LAB
BASOPHILS # (AUTO): 0.1 K/MM3 (ref 0–0.1)
BASOPHILS NFR BLD AUTO: 0.8 % (ref 0–1.8)
BUN SERPL-MCNC: 23 MG/DL (ref 7–17)
BUN/CREAT SERPL: 38 %
CALCIUM SERPL-MCNC: 10.1 MG/DL (ref 8.4–10.2)
EOSINOPHIL # BLD AUTO: 0.1 K/MM3 (ref 0–0.4)
EOSINOPHIL NFR BLD AUTO: 1.1 % (ref 0–4.3)
HCT VFR BLD CALC: 41.4 % (ref 30.3–42.9)
HEMOLYSIS INDEX: 2
HGB BLD-MCNC: 13 GM/DL (ref 10.1–14.3)
LYMPHOCYTES # BLD AUTO: 2.4 K/MM3 (ref 1.2–5.4)
LYMPHOCYTES NFR BLD AUTO: 36.7 % (ref 13.4–35)
MCHC RBC AUTO-ENTMCNC: 31 % (ref 30–34)
MCV RBC AUTO: 72 FL (ref 79–97)
MONOCYTES # (AUTO): 0.6 K/MM3 (ref 0–0.8)
MONOCYTES % (AUTO): 9.6 % (ref 0–7.3)
PLATELET # BLD: 350 K/MM3 (ref 140–440)
RBC # BLD AUTO: 5.73 M/MM3 (ref 3.65–5.03)

## 2020-04-13 PROCEDURE — 71046 X-RAY EXAM CHEST 2 VIEWS: CPT

## 2020-04-13 PROCEDURE — 80048 BASIC METABOLIC PNL TOTAL CA: CPT

## 2020-04-13 PROCEDURE — 36415 COLL VENOUS BLD VENIPUNCTURE: CPT

## 2020-04-13 PROCEDURE — 93010 ELECTROCARDIOGRAM REPORT: CPT

## 2020-04-13 PROCEDURE — 85025 COMPLETE CBC W/AUTO DIFF WBC: CPT

## 2020-04-13 PROCEDURE — 93005 ELECTROCARDIOGRAM TRACING: CPT

## 2020-04-13 PROCEDURE — 84484 ASSAY OF TROPONIN QUANT: CPT

## 2020-04-13 NOTE — XRAY REPORT
CHEST 2 VIEWS 



INDICATION / CLINICAL INFORMATION:

Chest Pain.



COMPARISON: 

Chest radiograph 11/24/2019



FINDINGS:



SUPPORT DEVICES: None.



HEART / MEDIASTINUM: No significant abnormality. 



LUNGS / PLEURA: No significant pulmonary or pleural abnormality. No pneumothorax. 



ADDITIONAL FINDINGS: No significant additional findings.



IMPRESSION:

No acute finding.



Signer Name: Douglas Ugalde MD 

Signed: 4/13/2020 1:46 PM

Workstation Name: [id number]
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
## 2020-10-30 ENCOUNTER — HOSPITAL ENCOUNTER (OUTPATIENT)
Dept: HOSPITAL 5 - ED | Age: 70
Setting detail: OBSERVATION
LOS: 1 days | Discharge: HOME | End: 2020-10-31
Attending: INTERNAL MEDICINE | Admitting: INTERNAL MEDICINE
Payer: MEDICARE

## 2020-10-30 DIAGNOSIS — R07.89: Primary | ICD-10-CM

## 2020-10-30 DIAGNOSIS — I50.9: ICD-10-CM

## 2020-10-30 DIAGNOSIS — E78.5: ICD-10-CM

## 2020-10-30 DIAGNOSIS — Z87.442: ICD-10-CM

## 2020-10-30 DIAGNOSIS — Z98.51: ICD-10-CM

## 2020-10-30 DIAGNOSIS — I25.10: ICD-10-CM

## 2020-10-30 DIAGNOSIS — I11.0: ICD-10-CM

## 2020-10-30 DIAGNOSIS — Z90.710: ICD-10-CM

## 2020-10-30 DIAGNOSIS — Z79.82: ICD-10-CM

## 2020-10-30 DIAGNOSIS — E66.01: ICD-10-CM

## 2020-10-30 DIAGNOSIS — K21.9: ICD-10-CM

## 2020-10-30 DIAGNOSIS — M19.90: ICD-10-CM

## 2020-10-30 DIAGNOSIS — Z79.4: ICD-10-CM

## 2020-10-30 DIAGNOSIS — E11.9: ICD-10-CM

## 2020-10-30 DIAGNOSIS — M32.9: ICD-10-CM

## 2020-10-30 DIAGNOSIS — Z90.49: ICD-10-CM

## 2020-10-30 DIAGNOSIS — Z98.890: ICD-10-CM

## 2020-10-30 DIAGNOSIS — F17.200: ICD-10-CM

## 2020-10-30 DIAGNOSIS — J45.909: ICD-10-CM

## 2020-10-30 LAB
BASOPHILS # (AUTO): 0 K/MM3 (ref 0–0.1)
BASOPHILS NFR BLD AUTO: 0.5 % (ref 0–1.8)
BUN SERPL-MCNC: 11 MG/DL (ref 7–17)
BUN/CREAT SERPL: 18 %
CALCIUM SERPL-MCNC: 9.7 MG/DL (ref 8.4–10.2)
EOSINOPHIL # BLD AUTO: 0.1 K/MM3 (ref 0–0.4)
EOSINOPHIL NFR BLD AUTO: 1.9 % (ref 0–4.3)
HCT VFR BLD CALC: 36.6 % (ref 30.3–42.9)
HEMOLYSIS INDEX: 2
HGB BLD-MCNC: 12 GM/DL (ref 10.1–14.3)
LYMPHOCYTES # BLD AUTO: 3 K/MM3 (ref 1.2–5.4)
LYMPHOCYTES NFR BLD AUTO: 41.1 % (ref 13.4–35)
MCHC RBC AUTO-ENTMCNC: 33 % (ref 30–34)
MCV RBC AUTO: 72 FL (ref 79–97)
MONOCYTES # (AUTO): 0.9 K/MM3 (ref 0–0.8)
MONOCYTES % (AUTO): 12.4 % (ref 0–7.3)
PLATELET # BLD: 298 K/MM3 (ref 140–440)
RBC # BLD AUTO: 5.07 M/MM3 (ref 3.65–5.03)

## 2020-10-30 PROCEDURE — 84484 ASSAY OF TROPONIN QUANT: CPT

## 2020-10-30 PROCEDURE — 78452 HT MUSCLE IMAGE SPECT MULT: CPT

## 2020-10-30 PROCEDURE — 71045 X-RAY EXAM CHEST 1 VIEW: CPT

## 2020-10-30 PROCEDURE — 85025 COMPLETE CBC W/AUTO DIFF WBC: CPT

## 2020-10-30 PROCEDURE — 96376 TX/PRO/DX INJ SAME DRUG ADON: CPT

## 2020-10-30 PROCEDURE — 96374 THER/PROPH/DIAG INJ IV PUSH: CPT

## 2020-10-30 PROCEDURE — 99291 CRITICAL CARE FIRST HOUR: CPT

## 2020-10-30 PROCEDURE — 80048 BASIC METABOLIC PNL TOTAL CA: CPT

## 2020-10-30 PROCEDURE — A9502 TC99M TETROFOSMIN: HCPCS

## 2020-10-30 PROCEDURE — 93005 ELECTROCARDIOGRAM TRACING: CPT

## 2020-10-30 PROCEDURE — 93306 TTE W/DOPPLER COMPLETE: CPT

## 2020-10-30 PROCEDURE — 93017 CV STRESS TEST TRACING ONLY: CPT

## 2020-10-30 PROCEDURE — 80061 LIPID PANEL: CPT

## 2020-10-30 PROCEDURE — G0378 HOSPITAL OBSERVATION PER HR: HCPCS

## 2020-10-30 PROCEDURE — 82962 GLUCOSE BLOOD TEST: CPT

## 2020-10-30 PROCEDURE — 36415 COLL VENOUS BLD VENIPUNCTURE: CPT
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
## 2020-10-30 NOTE — XRAY REPORT
CHEST 1 VIEW 



INDICATION / CLINICAL INFORMATION:

Chest Pain.



COMPARISON: 

11/24/2019



FINDINGS:



SUPPORT DEVICES: None.



HEART / MEDIASTINUM: No significant abnormality. 



LUNGS / PLEURA: No significant pulmonary or pleural abnormality. No pneumothorax. 



ADDITIONAL FINDINGS: No significant additional findings.



IMPRESSION:

1. No acute findings. No interval change.



Signer Name: Shruthi Duran MD 

Signed: 10/30/2020 10:31 PM

Workstation Name: [id number]

## 2020-10-31 VITALS — DIASTOLIC BLOOD PRESSURE: 83 MMHG | SYSTOLIC BLOOD PRESSURE: 139 MMHG

## 2020-10-31 LAB
BASOPHILS # (AUTO): 0 K/MM3 (ref 0–0.1)
BASOPHILS NFR BLD AUTO: 0.7 % (ref 0–1.8)
BUN SERPL-MCNC: 8 MG/DL (ref 7–17)
BUN/CREAT SERPL: 13 %
CALCIUM SERPL-MCNC: 9.2 MG/DL (ref 8.4–10.2)
EOSINOPHIL # BLD AUTO: 0.2 K/MM3 (ref 0–0.4)
EOSINOPHIL NFR BLD AUTO: 2.8 % (ref 0–4.3)
HCT VFR BLD CALC: 34.5 % (ref 30.3–42.9)
HDLC SERPL-MCNC: 60 MG/DL (ref 40–59)
HEMOLYSIS INDEX: 5
HGB BLD-MCNC: 11.2 GM/DL (ref 10.1–14.3)
LYMPHOCYTES # BLD AUTO: 2.4 K/MM3 (ref 1.2–5.4)
LYMPHOCYTES NFR BLD AUTO: 40.8 % (ref 13.4–35)
MCHC RBC AUTO-ENTMCNC: 32 % (ref 30–34)
MCV RBC AUTO: 73 FL (ref 79–97)
MONOCYTES # (AUTO): 0.6 K/MM3 (ref 0–0.8)
MONOCYTES % (AUTO): 10.8 % (ref 0–7.3)
PLATELET # BLD: 271 K/MM3 (ref 140–440)
RBC # BLD AUTO: 4.76 M/MM3 (ref 3.65–5.03)

## 2020-10-31 RX ADMIN — MORPHINE SULFATE PRN MG: 2 INJECTION, SOLUTION INTRAMUSCULAR; INTRAVENOUS at 01:41

## 2020-10-31 RX ADMIN — MORPHINE SULFATE PRN MG: 2 INJECTION, SOLUTION INTRAMUSCULAR; INTRAVENOUS at 05:42

## 2020-10-31 NOTE — CONSULTATION
History of Present Illness


Consult reason: chest pain


History of present illness: 





70-year-old -American female presenting with bilateral shoulder pain and 

neck pain for 3 days with no real relief using sublingual nitroglycerin.  

Patient presented to the emergency room and was admitted for further evaluation.

 She does have a history of coronary artery disease and gives a history of 

having a cardiac catheterization done in the past with stent placement.  She 

however had a Lexiscan thallium scan done  in 2019 approximately 1 year ago 

which was negative.





Past History


Past Medical History: arthritis, CAD, diabetes, GERD, heart failure, 

hypertension, hyperlipidemia, other (Lupus,Prinzmetal Angina,Chronic back Pain,)


Past Surgical History: hysterectomy, Other (Eye surgery in 2000, Carpal tunnel 

surgery on right hand, Colonic polyp.)


Social history: smoking (Current daily smoker)


Family history: no significant family history





Medications and Allergies


                                    Allergies











Allergy/AdvReac Type Severity Reaction Status Date / Time


 


azithromycin [From Zithromax] Allergy  Rash Verified 04/30/20 16:31


 


butorphanol tartrate Allergy  Rash Verified 04/30/20 16:31





[From Stadol]     


 


cyclobenzaprine HCl Allergy  Seizure Verified 04/30/20 16:31





[From Flexeril]     


 


ibuprofen [From Motrin] Allergy  Vomiting Verified 04/30/20 16:31


 


meperidine HCl [From Demerol] Allergy  Rash Verified 04/30/20 16:31


 


naproxen Allergy  Seizure Verified 04/30/20 16:31


 


tramadol Allergy  Unknown Verified 04/30/20 16:31


 


acetaminophen [From Tylenol] AdvReac  Nausea Verified 04/30/20 16:31











                                Home Medications











 Medication  Instructions  Recorded  Confirmed  Last Taken  Type


 


hydroCHLOROthiazide [HCTZ] 25 mg PO DAILY 04/25/15 09/11/19 09/10/19 History


 


Albuterol Mdi (or & Nicu Only) 2 puff IH QID PRN #1 inhalation 01/14/18 09/11/19

 09/10/19 Rx





[ProAir HFA Inhaler]     


 


Promethazine [Phenergan SUPPOS] 25 mg NE Q6HR PRN #10 supp.rect 06/27/18 

09/11/19 09/10/19 Rx


 


Aspirin [Aspirin BABY CHEW TAB] 81 mg PO ONCE #30 tab.chew 09/12/19  Unknown Rx


 


AtorvaSTATin [Lipitor] 40 mg PO QHS #30 tablet 09/12/19  Unknown Rx


 


Famotidine [Pepcid] 20 mg PO BID #30 tablet 09/12/19  Unknown Rx


 


metFORMIN [Glucophage] 500 mg PO BID #60 tablet 09/12/19  Unknown Rx


 


Acetaminophen/Codeine [Tylenol 1 tab PO Q6H PRN #12 tab 11/24/19  Unknown Rx





/Codeine # 3 tab]     


 


Benzonatate [Tessalon Perles] 100 mg PO Q8HR PRN #20 capsule 11/24/19  Unknown 

Rx


 


methylPREDNISolone [Medrol 4MG 4 mg PO DAILY #21 tab.ds.pk 01/15/20  Unknown Rx





DOSEPAK (21 tabs)]     


 


oxyCODONE /ACETAMINOPHEN [Percocet 1 tab PO Q6HR PRN #10 tablet 04/30/20  

Unknown Rx





5/325 mg]     











Active Meds: 


Active Medications





Acetaminophen (Tylenol)  650 mg PO Q4H PRN


   PRN Reason: Pain MILD(1-3)/Fever >100.5/HA


Aspirin (Ecotrin)  325 mg PO QDAY [facility]


Dextrose (D50w (25gm) Syringe)  0 ml IV Q30MIN PRN; Protocol


   PRN Reason: Hypoglycemia


Enoxaparin Sodium (Enoxaparin)  40 mg SUB-Q QDAY@2200 IVAN; Protocol


Insulin Human Lispro (Humalog)  0 unit SUB-Q ACHS IVAN; Protocol


Magnesium Hydroxide (Milk Of Magnesia)  30 ml PO Q4H PRN


   PRN Reason: Constipation


Morphine Sulfate (Morphine)  2 mg IV Q5MIN PRN


   PRN Reason: Chest Pain


   Last Admin: 10/31/20 05:42 Dose:  2 mg


   Documented by: 


Nitroglycerin (Nitrostat)  0.4 mg SL Q5M PRN


   PRN Reason: Chest Pain


Ondansetron HCl (Zofran)  4 mg IV Q8H PRN


   PRN Reason: Nausea And Vomiting


Promethazine HCl (Phenergan)  25 mg NE Q6H PRN


   PRN Reason: Nausea And Vomiting


   Last Admin: 10/31/20 05:43 Dose:  25 mg


   Documented by: 


Sodium Chloride (Sodium Chloride Flush Syringe 10 Ml)  10 ml IV BID IVAN


Sodium Chloride (Sodium Chloride Flush Syringe 10 Ml)  10 ml IV PRN PRN


   PRN Reason: LINE FLUSH











Review of Systems


Constitutional: no weight loss, no weight gain, no sweats, no anorexia, no 

fatigue, no weakness


Ears, nose, mouth and throat: no ear pain, no ear discharge, no tinnitis, no 

nose pain, no epistaxis, no bleeding gums, no hoarseness


Breasts: normal


Cardiovascular: no chest pain, no orthopnea, no palpitations, no edema, no 

shortness of breath, no claudication, no leg edema


Respiratory: no cough, no cough with sputum, no shortness of breath, no 

congestion, no wheezing, no pleurisy


Gastrointestinal: no abdominal pain, no vomiting, no diarrhea, no constipation, 

no hematemesis, no melena, no hematochezia, no loss of appetite, no indigestion


Genitourinary Female: no dysmenorrhea, no pelvic pain, no flank pain


Rectal: no pain, no incontinence, no bleeding


Musculoskeletal: no neck stiffness, no neck pain, no low back pain, no redness 

of joints


Integumentary: no rash, no pruritis, no redness, no bullae, no lesions


Neurological: no head injury, no transient paralysis, no paralysis, no weakness,

no parathesias


Endocrine: no cold intolerance, no heat intolerance, no polyphagia, no excessive

thirst, no polydipsia, no polyuria


Hematologic/Lymphatic: no easy bruising


Allergic/Immunologic: no urticaria, no allergic rhinitis, no wheezing





Physical Examination


                                   Vital Signs











Temp Pulse Resp BP Pulse Ox


 


 98.4 F   96 H  20   147/73   97 


 


 10/30/20 21:58  10/30/20 21:58  10/30/20 21:58  10/30/20 21:58  10/30/20 21:58











General appearance: no acute distress, obese





Results





                                 10/31/20 07:29





                                 10/31/20 07:29


                                     Lipids











  10/31/20 Range/Units





  07:29 


 


Triglycerides  84  (2-149)  mg/dL


 


Cholesterol  150  ()  mg/dL


 


HDL Cholesterol  60 H  (40-59)  mg/dL


 


Cholesterol/HDL Ratio  2.50  %








                                       CBC











  10/30/20 10/31/20 Range/Units





  23:00 07:29 


 


WBC  7.4  6.0  (4.5-11.0)  K/mm3


 


RBC  5.07 H  4.76  (3.65-5.03)  M/mm3


 


Hgb  12.0  11.2  (10.1-14.3)  gm/dl


 


Hct  36.6  34.5  (30.3-42.9)  %


 


Plt Count  298  271  (140-440)  K/mm3


 


Lymph # (Auto)  3.0  2.4  (1.2-5.4)  K/mm3


 


Mono # (Auto)  0.9 H  0.6  (0.0-0.8)  K/mm3


 


Eos # (Auto)  0.1  0.2  (0.0-0.4)  K/mm3


 


Baso # (Auto)  0.0  0.0  (0.0-0.1)  K/mm3








                          Comprehensive Metabolic Panel











  10/30/20 10/31/20 Range/Units





  23:00 07:29 


 


Sodium  144  141  (137-145)  mmol/L


 


Potassium  3.9  3.6  (3.6-5.0)  mmol/L


 


Chloride  102.3  99.5  ()  mmol/L


 


Carbon Dioxide  32 H  36 H  (22-30)  mmol/L


 


BUN  11  8  (7-17)  mg/dL


 


Creatinine  0.6  0.6  (0.6-1.2)  mg/dL


 


Glucose  108 H  93  ()  mg/dL


 


Calcium  9.7  9.2  (8.4-10.2)  mg/dL














Assessment and Plan





1.  Musculoskeletal chest wall and shoulder pain and.


2.  History of coronary artery disease with a history of PCI in the past


3.  Type 2 diabetes mellitus


4.  Essential hypertension


5.  Hyperlipidemia


6.  GERD


7.  Lupus erythematosus





Patient is currently stable patient symptoms not consistent with acute coronary 

syndrome most likely musculoskeletal in origin serum troponin levels are normal 

patient has already been scheduled for an echo and stress MPI which will be done

would however recommend treatment with muscle relaxants and nonsteroidal anti-

inflammatory drugs as needed.

## 2020-10-31 NOTE — EVENT NOTE
Date: 10/31/20





Lexiscan MPI done today negative for ischemic CAD


Echocardiogram  shows mild concentric LVH with normal global and regional LV 

function.

## 2020-10-31 NOTE — EVENT NOTE
Date: 10/31/20





This a 70 year old female patient who came to ED with worsening chest pain and 

and shoulder pain.


She reports her chest pain started 3 days ago and she took multiple nitro 

multiple times over the last 3 days which gives her some temporary relief of her

chest pain.


She reports she takes daily aspirin. 


Cardiologist consulted 


Reviewed cardiologist note and reces 


Lexiscan MPI done today negative for ischemic CAD


Echocardiogram  shows mild concentric LVH with normal global and regional LV 

function.

## 2020-10-31 NOTE — DISCHARGE SUMMARY
<GERMANIA LANDERS - Last Filed: 10/31/20 14:25>





Providers





- Providers


Date of Admission: 


10/31/20 00:43





Attending physician: 


GERMANIA LANDERS





                                        





10/31/20


Consult to Cardiac Rehabilitation [CONS] Routine 


   Reason For Exam: Phase I





10/31/20 01:17


Consult to Cardiology [CONS] Routine 


   Consulting Provider: YESENIA EID


   Reason For Exam: chest pain


Consult to Dietitian/Nutrition [CONS] Routine 


   Physician Instructions: 


   Reason For Exam: 


   Reason for Consult: Diet education











Primary care physician: 


PRIMARY CARE MD








Hospitalization


Condition: Critical


Disposition: DC-01 TO HOME OR SELFCARE





Exam





- Constitutional


Vitals: 


                                        











Temp Pulse Resp BP Pulse Ox


 


 98.2 F   66   18   139/83   98 


 


 10/31/20 12:08  10/31/20 12:08  10/31/20 12:08  10/31/20 12:08  10/31/20 12:08














Plan


Follow up with: 


PRIMARY CARE,MD [Primary Care Provider] - 7 Days


CHAPIS GONZALES MD [Staff Physician] - 7 Days


Prescriptions: 


AtorvaSTATin [Lipitor] 40 mg PO QHS 30 Days #30 tablet


Aspirin [Aspirin BABY CHEW TAB] 81 mg PO ONCE #30 tab.chew


metFORMIN [Glucophage] 500 mg PO BID 30 Days #60 tablet


hydroCHLOROthiazide [HCTZ] 25 mg PO DAILY 30 Days #30


methylPREDNISolone [Medrol 4MG DOSEPAK (21 tabs)] 4 mg PO DAILY #21 tab.ds.pk


Nitroglycerin [Nitrostat] 0.4 mg SL Q5M PRN #30 tablet


 PRN Reason: Chest Pain


Famotidine [Pepcid] 20 mg PO BID 60 Days #30 tablet





<KALPANA ABREU - Last Filed: 10/31/20 15:54>





Providers





- Providers


Date of Admission: 


10/31/20 00:43





Date of discharge: 10/31/20


Attending physician: 


GERMANIA LANDERS





                                        





10/31/20


Consult to Cardiac Rehabilitation [CONS] Routine 


   Reason For Exam: Phase I





10/31/20 01:17


Consult to Cardiology [CONS] Routine 


   Consulting Provider: YESENIA EID


   Reason For Exam: chest pain


Consult to Dietitian/Nutrition [CONS] Routine 


   Physician Instructions: 


   Reason For Exam: 


   Reason for Consult: Diet education











Primary care physician: 


PRIMARY CARE MD








Hospitalization


Hospital course: 





This a 70 year old female patient who came to ED with worsening chest pain and 

and shoulder pain.


She reports her chest pain started 3 days ago and she took multiple nitro 

multiple times over the last 3 days which gives her some temporary relief of her

 chest pain.


She reports she takes daily aspirin. 


Cardiologist consulted: 


Reviewed cardiologist note and reces 


Lexiscan MPI done today negative for ischemic CAD


Echocardiogram  shows mild concentric LVH with normal global and regional LV 

function





(1) Acute chest pain


Current Visit: Yes   Status: Acute   


Plan to address problem: 


Started on aspirin, sublingual nitroglycerin and IV morphine as needed for chest

 pain.


cardiology evaluation and recommendation.








(2) GERD (gastroesophageal reflux disease)


Current Visit: No   Status: Acute   


Plan to address problem: 


resumed routine home medications .





(3) Diabetes


Current Visit: No   Status: Chronic   


Plan to address problem: 


 Accu-Cheks.








(4) HTN (hypertension)


Current Visit: No   Status: Chronic   


Qualifiers: 


   Hypertension type: essential hypertension   Qualified Code(s): I10 - 

Essential (primary) hypertension   


Plan to address problem: 


resumed routine home medications and monitor vital signs closely.








(5) Morbid obesity


Current Visit: No   Status: Chronic   


Plan to address problem: 


Discussed lifestyle modification


healthy diet and weight management





10/31/20 Patient seen at bedside eating lunch.


Patient on room air. She denies any distress. She said she came beecause she was

 having chest discomfort/pain and left shoulder pain. She reports hx of chronic 

back and shoulder pain and on pain management program. patient advised to f/u 

with pain management doctor and her PCP


Patient also advised to follow up with cardiologist. She denies hx of anxiety 

and depression. Patient is discharged and she medically stable at time of 

discharge assessment.





Core Measure Documentation





- Palliative Care


Palliative Care/ Comfort Measures: Not Applicable





- Core Measures


Any of the following diagnoses?: none





Exam





- Constitutional


Vitals: 


                                        











Temp Pulse Resp BP Pulse Ox


 


 97.5 F L  71   20   129/64   98 


 


 10/31/20 02:52  10/31/20 02:52  10/31/20 05:42  10/31/20 09:21  10/31/20 02:52











General appearance: Present: no acute distress, obese





- EENT


Eyes: Present: PERRL


ENT: hearing intact, clear oral mucosa





- Neck


Neck: Present: supple, normal ROM





- Respiratory


Respiratory effort: normal


Respiratory: bilateral: CTA





- Cardiovascular


Heart rate: 72


Heart Sounds: Present: S1 & S2.  Absent: rub, click





- Extremities


Extremities: pulses symmetrical, No edema


Peripheral Pulses: within normal limits





- Abdominal


General gastrointestinal: Present: soft, non-tender, non-distended, normal bowel

 sounds


Female genitourinary: Present: normal





- Integumentary


Integumentary: Present: clear, warm, dry





- Musculoskeletal


Musculoskeletal: gait normal, strength equal bilaterally





- Psychiatric


Psychiatric: appropriate mood/affect, intact judgment & insight





- Neurologic


Neurologic: CNII-XII intact, moves all extremities





- Allied Health


Allied health notes reviewed: nursing





Plan


Diet: low fat, low cholesterol, low salt


Special Instructions: other

## 2020-10-31 NOTE — HISTORY AND PHYSICAL REPORT
History of Present Illness


Date of examination: 10/31/20


Date of admission: 


10/31/20 00:43





Chief complaint: 





Chest Pain


History of present illness: 





70-year-old female with known history of hypertension, CHF, diabetes mellitus, 

history of MI in 2003 and 2013, presenting to the emergency room today 

complaining of chest pain and shoulder pain.  Chest pain was said to have 

started about 3 days ago and has gotten worse today.  She has taken some 

sublingual nitroglycerin on multiple occasions with some relief.  On a scale of 

10 chest pain was 10/10 in severity.  It is substernal and nonradiating.  

Patient is unclear whether the shoulder pain is causing the chest pain.


Chest pain is worse on exertion and gets better on resting.  She has had 

associated shortness of breath.  Denies any nausea vomiting and denies any 

abdominal pain.  Patient denies any headache or dizziness, denies any fever or 

chills.





Work-up in the emergency room today including EKG, chest x-ray and troponin 

levels has been unremarkable.


Patient has been admitted for work-up of her chest pain.





Past History


Past Medical History: arthritis, CAD, diabetes, GERD, heart failure, 

hypertension, hyperlipidemia, other (Lupus,Prinzmetal Angina,Chronic back Pain,)


Past Surgical History: hysterectomy, Other (Eye surgery in 2000,Laser procedure 

heart ,Carpal tunnel surgery on right hand, Colonic polyp.)


Social history: smoking (Current daily smoker)


Family history: no significant family history





Medications and Allergies


                                    Allergies











Allergy/AdvReac Type Severity Reaction Status Date / Time


 


azithromycin [From Zithromax] Allergy  Rash Verified 04/30/20 16:31


 


butorphanol tartrate Allergy  Rash Verified 04/30/20 16:31





[From Stadol]     


 


cyclobenzaprine HCl Allergy  Seizure Verified 04/30/20 16:31





[From Flexeril]     


 


ibuprofen [From Motrin] Allergy  Vomiting Verified 04/30/20 16:31


 


meperidine HCl [From Demerol] Allergy  Rash Verified 04/30/20 16:31


 


naproxen Allergy  Seizure Verified 04/30/20 16:31


 


tramadol Allergy  Unknown Verified 04/30/20 16:31


 


acetaminophen [From Tylenol] AdvReac  Nausea Verified 04/30/20 16:31











                                Home Medications











 Medication  Instructions  Recorded  Confirmed  Last Taken  Type


 


hydroCHLOROthiazide [HCTZ] 25 mg PO DAILY 04/25/15 09/11/19 09/10/19 History


 


Albuterol Mdi (or & Nicu Only) 2 puff IH QID PRN #1 inhalation 01/14/18 09/11/19

 09/10/19 Rx





[ProAir HFA Inhaler]     


 


Promethazine [Phenergan SUPPOS] 25 mg [place] Q6HR PRN #10 supp.rect 06/27/18 

09/11/19 09/10/19 Rx


 


Aspirin [Aspirin BABY CHEW TAB] 81 mg PO ONCE #30 tab.chew 09/12/19  Unknown Rx


 


AtorvaSTATin [Lipitor] 40 mg PO QHS #30 tablet 09/12/19  Unknown Rx


 


Famotidine [Pepcid] 20 mg PO BID #30 tablet 09/12/19  Unknown Rx


 


metFORMIN [Glucophage] 500 mg PO BID #60 tablet 09/12/19  Unknown Rx


 


Acetaminophen/Codeine [Tylenol 1 tab PO Q6H PRN #12 tab 11/24/19  Unknown Rx





/Codeine # 3 tab]     


 


Benzonatate [Tessalon Perles] 100 mg PO Q8HR PRN #20 capsule 11/24/19  Unknown 

Rx


 


methylPREDNISolone [Medrol 4MG 4 mg PO DAILY #21 tab.ds.pk 01/15/20  Unknown Rx





DOSEPAK (21 tabs)]     


 


oxyCODONE /ACETAMINOPHEN [Percocet 1 tab PO Q6HR PRN #10 tablet 04/30/20  

Unknown Rx





5/325 mg]     











Active Meds: 


Active Medications





Acetaminophen (Tylenol)  650 mg PO Q4H PRN


   PRN Reason: Pain MILD(1-3)/Fever >100.5/HA


Acetaminophen (Tylenol)  650 mg PO Q6H PRN


   PRN Reason: Pain, Mild (1-3)


Aspirin (Ecotrin)  325 mg PO QDAY IVAN


Dextrose (D50w (25gm) Syringe)  50 ml IV Q30MIN PRN; Protocol


   PRN Reason: Hypoglycemia


Dextrose (D50w (25gm) Syringe)  50 ml IV Q30MIN PRN; Protocol


   PRN Reason: Hypoglycemia


Enoxaparin Sodium (Enoxaparin)  40 mg SUB-Q QDAY@2200 IVAN; Protocol


Insulin Human Lispro (Humalog)  0 unit SUB-Q ACHS IVAN; Protocol


Magnesium Hydroxide (Milk Of Magnesia)  30 ml PO Q4H PRN


   PRN Reason: Constipation


Morphine Sulfate (Morphine)  2 mg IV Q5MIN PRN


   PRN Reason: Chest Pain


Nitroglycerin (Nitrostat)  0.4 mg SL Q5M PRN


   PRN Reason: Chest Pain


Ondansetron HCl (Zofran)  4 mg IV Q8H PRN


   PRN Reason: Nausea And Vomiting


Sodium Chloride (Sodium Chloride Flush Syringe 10 Ml)  10 ml IV BID IVAN


Sodium Chloride (Sodium Chloride Flush Syringe 10 Ml)  10 ml IV PRN PRN


   PRN Reason: LINE FLUSH


Sodium Chloride (Sodium Chloride Flush Syringe 10 Ml)  10 ml IV PRN PRN


   PRN Reason: LINE FLUSH











Review of Systems


Constitutional: no fever, no chills


Ears, nose, mouth and throat: no nasal congestion, no sore throat


Cardiovascular: chest pain, no palpitations


Respiratory: no cough, no shortness of breath


Gastrointestinal: no abdominal pain, no nausea, no vomiting, no diarrhea


Genitourinary Female: no flank pain, no dysuria, no hematuria


Musculoskeletal: no neck pain, no low back pain


Integumentary: no rash, no pruritis


Neurological: no headaches, no confusion


Psychiatric: no anxiety, no depression





Exam





- Constitutional


Vitals: 


                                        











Temp Pulse Resp BP Pulse Ox


 


 98.1 F   77   18   129/39   98 


 


 10/30/20 23:35  10/31/20 00:08  10/30/20 23:35  10/31/20 00:08  10/30/20 23:35











General appearance: Present: no acute distress, well-nourished





- EENT


Eyes: Present: PERRL, EOM intact.  Absent: scleral icterus


ENT: hearing intact, clear oral mucosa, dentition normal





- Neck


Neck: Present: supple, normal ROM





- Respiratory


Respiratory effort: normal


Respiratory: bilateral: CTA





- Cardiovascular


Rhythm: regular


Heart Sounds: Present: S1 & S2.  Absent: gallop, systolic murmur, diastolic 

murmur, rub





- Extremities


Extremities: no ischemia, pulses intact, pulses symmetrical, No edema, Full ROM


Peripheral Pulses: within normal limits





- Abdominal


General gastrointestinal: Present: soft, non-tender, non-distended, normal bowel

sounds.  Absent: mass





- Integumentary


Integumentary: Present: clear, warm, dry





- Musculoskeletal


Musculoskeletal: strength equal bilaterally





- Psychiatric


Psychiatric: appropriate mood/affect, intact judgment & insight, memory intact, 

cooperative





- Neurologic


Neurologic: CNII-XII intact, no focal deficits, moves all extremities





HEART Score





- HEART Score


History: Moderately suspicious


EKG: Non-specific


Age: > 65


Risk factors: > 3 risk factors or hx of atherosclerotic disease


Troponin: 


                                        











Troponin T  < 0.010 ng/mL (0.00-0.029)   10/30/20  23:00    











Troponin: > 3x normal limit


HEART Score: 8





Results





- Labs


CBC & Chem 7: 


                                 10/30/20 23:00





                                 10/30/20 23:00


Labs: 


                              Abnormal lab results











  10/30/20 10/30/20 Range/Units





  23:00 23:00 


 


RBC  5.07 H   (3.65-5.03)  M/mm3


 


MCV  72 L   (79-97)  fl


 


MCH  24 L   (28-32)  pg


 


RDW  15.5 H   (13.2-15.2)  %


 


Lymph % (Auto)  41.1 H   (13.4-35.0)  %


 


Mono % (Auto)  12.4 H   (0.0-7.3)  %


 


Mono # (Auto)  0.9 H   (0.0-0.8)  K/mm3


 


Carbon Dioxide   32 H  (22-30)  mmol/L


 


Glucose   108 H  ()  mg/dL














Assessment and Plan





- Patient Problems


(1) Acute chest pain


Current Visit: Yes   Status: Acute   


Plan to address problem: 


Patient admitted and placed on telemetry.  Will check serial cardiac enzymes.


We will place patient on aspirin, sublingual nitroglycerin and IV morphine as 

needed for chest pain.


We will request cardiology evaluation and recommendation.








(2) GERD (gastroesophageal reflux disease)


Current Visit: No   Status: Acute   


Plan to address problem: 


We will resume routine home medications .








(3) Diabetes


Current Visit: No   Status: Chronic   


Plan to address problem: 


We will monitor Accu-Cheks.








(4) HTN (hypertension)


Current Visit: No   Status: Chronic   


Qualifiers: 


   Hypertension type: essential hypertension   Qualified Code(s): I10 - 

Essential (primary) hypertension   


Plan to address problem: 


We will resume routine home medications and monitor vital signs closely.








(5) Morbid obesity


Current Visit: No   Status: Chronic   


Plan to address problem: 


We will place dietary consult for evaluation.








(6) DVT prophylaxis


Current Visit: No   Status: Acute   


Plan to address problem: 


Patient placed on subcutaneous heparin.








(7) Full code status


Current Visit: Yes   Status: Acute

## 2020-11-02 NOTE — TREADMILL REPORT
THALLIUM STRESS TEST REPORT



LEFT VENTRICLE:  Left ventricular chamber size is within normal spread. 

Perfusion study demonstrates normal apical thinning, otherwise homogeneous

uptake of the tracer in all segments, no significant defects identified.



Gated analysis demonstrates normal left ventricular systolic function, ejection

fraction 69%.



CONCLUSION:  Normal myocardial perfusion study.





DD: 11/02/2020 10:52

DT: 11/02/2020 16:51

JOB# [id number]  [id number]

CA/NTS

## 2021-01-27 ENCOUNTER — HOSPITAL ENCOUNTER (EMERGENCY)
Dept: HOSPITAL 5 - ED | Age: 71
Discharge: LEFT BEFORE DISCHARGE | End: 2021-01-27
Payer: MEDICARE

## 2021-01-27 VITALS — DIASTOLIC BLOOD PRESSURE: 71 MMHG | SYSTOLIC BLOOD PRESSURE: 170 MMHG

## 2021-01-27 DIAGNOSIS — Z53.21: ICD-10-CM

## 2021-01-27 DIAGNOSIS — R04.0: Primary | ICD-10-CM

## 2021-01-27 NOTE — EMERGENCY DEPARTMENT REPORT
Chief Complaint: Nosebleed


Stated Complaint: NOSE BLEED, LEFT SIDE PAIN


Time Seen by Provider: 01/27/21 15:08





- HPI


History of Present Illness: 





Patient is a 70-year-old -American female that comes to the emergency 

room after having a left nasal nosebleed.  Bleeding stopped by the time she got 

to the emergency room.  She takes aspirin as needed at home.  She is on no blood

thinners.





She states that she has a tendency to pick her nose and she picked a clot to c

lean and her nose started to bleed.  It alarmed her so she came to the ER.





She has no other bleeding.





Patient is alert and oriented in no acute distress in the ER.





- ROS


Review of Systems: 





None at this time, nosebleed resolved





- Exam


Vital Signs: 


                                   Vital Signs











  01/27/21





  15:06


 


Temperature 98.2 F


 


Pulse Rate 91 H


 


Respiratory 18





Rate 


 


Blood Pressure 170/71


 


O2 Sat by Pulse 98





Oximetry 











Physical Exam: 





Alert and oriented x4





S1-S2





Lungs clear to auscultation 





abdomen soft nontender





No CVA tenderness


MSE screening note: 


Focused history and physical exam performed.


Due to findings the following was ordered:





Patient discharged home with discharge instructions for nosebleeds.  She will 

follow-up with her PCP.  There is no life-threatening issues at the current 

time.  No chest pain shortness of breath.  No fever or chills.  Vital signs are 

normal.


Patient discussed with doctor:: DEJUAN DOSHI





ED Disposition for MSE


Clinical Impression: 


 Bleeding nose





Disposition: Z-07 MED SCREENING EXAM-LEFT


Is pt being admited?: No


Does the pt Need Aspirin: No


Condition: Stable


Additional Instructions: 


follow up with pcp





if nose bleeds- pressure, ice 





no aspirin tonight


no motrin tonight 


Referrals: 


KASANDRA LYONS MD [Staff Physician] - 3-5 Days


Time of Disposition: 15:15
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
## 2022-08-12 ENCOUNTER — HOSPITAL ENCOUNTER (EMERGENCY)
Dept: HOSPITAL 5 - ED | Age: 72
Discharge: LEFT BEFORE DISCHARGE | End: 2022-08-12
Payer: SELF-PAY
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
## 2022-08-12 DIAGNOSIS — Z53.21: ICD-10-CM
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
## 2022-08-12 DIAGNOSIS — Z00.00: Primary | ICD-10-CM
